# Patient Record
Sex: FEMALE | Race: WHITE | NOT HISPANIC OR LATINO | URBAN - METROPOLITAN AREA
[De-identification: names, ages, dates, MRNs, and addresses within clinical notes are randomized per-mention and may not be internally consistent; named-entity substitution may affect disease eponyms.]

---

## 2017-01-21 ENCOUNTER — EMERGENCY (EMERGENCY)
Facility: HOSPITAL | Age: 22
LOS: 0 days | Discharge: HOME | End: 2017-01-21
Admitting: INTERNAL MEDICINE

## 2017-01-21 DIAGNOSIS — R56.9 UNSPECIFIED CONVULSIONS: ICD-10-CM

## 2017-06-27 DIAGNOSIS — G40.909 EPILEPSY, UNSPECIFIED, NOT INTRACTABLE, WITHOUT STATUS EPILEPTICUS: ICD-10-CM

## 2017-06-27 DIAGNOSIS — R56.9 UNSPECIFIED CONVULSIONS: ICD-10-CM

## 2017-10-17 ENCOUNTER — EMERGENCY (EMERGENCY)
Facility: HOSPITAL | Age: 22
LOS: 0 days | Discharge: HOME | End: 2017-10-18
Admitting: INTERNAL MEDICINE
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
## 2017-10-17 DIAGNOSIS — S01.81XA LACERATION WITHOUT FOREIGN BODY OF OTHER PART OF HEAD, INITIAL ENCOUNTER: ICD-10-CM

## 2017-10-17 DIAGNOSIS — R56.9 UNSPECIFIED CONVULSIONS: ICD-10-CM

## 2017-10-17 DIAGNOSIS — Z79.899 OTHER LONG TERM (CURRENT) DRUG THERAPY: ICD-10-CM
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
## 2019-08-19 PROBLEM — Z00.00 ENCOUNTER FOR PREVENTIVE HEALTH EXAMINATION: Status: ACTIVE | Noted: 2019-08-19

## 2019-10-17 ENCOUNTER — APPOINTMENT (OUTPATIENT)
Dept: NEUROLOGY | Facility: CLINIC | Age: 24
End: 2019-10-17
Payer: COMMERCIAL

## 2019-10-17 VITALS
HEART RATE: 76 BPM | WEIGHT: 110 LBS | SYSTOLIC BLOOD PRESSURE: 105 MMHG | TEMPERATURE: 98.7 F | HEIGHT: 64 IN | BODY MASS INDEX: 18.78 KG/M2 | OXYGEN SATURATION: 99 % | DIASTOLIC BLOOD PRESSURE: 69 MMHG

## 2019-10-17 PROCEDURE — 95816 EEG AWAKE AND DROWSY: CPT

## 2019-10-17 PROCEDURE — 99214 OFFICE O/P EST MOD 30 MIN: CPT

## 2019-10-17 NOTE — HISTORY OF PRESENT ILLNESS
[FreeTextEntry1] : Misty is 23 years old right-handed who is being followed up by me for her primary generalized epilepsy syndrome.\par Her mom and her brother also has history of seizure and the mom the fact he has primary generalized epilepsy as well. Her brother has some autistic features.\par Misty is now working as a . She went to college could not continue having difficulty with subjects. She has a boyfriend she does not smoke or drink.\par At this point she is not driving.\par Unfortunately throughout the last 4 years that I have been taking care of her there were occasions that for different reasons she missed her doses and from there had seizures.\par Since her last visit in fact she did have 2 or 3 seizures one of them associated missing her doses. Now mom and dad are slightly more careful reminding her in different ways to take her medications.\par Her mood is described to be good she does have a slightly excessive sleepiness throughout the day or stays in bed longer than what should be expected for her age. On the other hand when she goes to work she is very active and not feeling tired.\par She did have a blood test done.. The Keppra and Lamictal levels aren't good both in mid tens.\par Her comprehensive metabolic panel is also normal.\par Today she did have one EEG done also that is normal however she did not have any drowsy or asleep recording

## 2019-10-17 NOTE — PHYSICAL EXAM
[FreeTextEntry1] : She appears to be tired. She shows some degree of slowness with processing. This is very much her baseline. She does have fixation on both sides and her fine motor movements are slightly clumsy.\par She shows good attention and concentration her vocabulary and language is good.\par Her motor exam is normal except to fixation and slight clumsiness. The deep tendon reflexes are present and symmetrical\par There is no tremor\par Her gait is stable Romberg is negative

## 2019-10-17 NOTE — ASSESSMENT
[FreeTextEntry1] : Misty is here for followup . She does carry the diagnosis of primary generalized epilepsy. She does have breakthrough seizures that the left than on associated not taking medication. This is quite unfortunate causes and her syndrome. The family at this point in came up with some plans that hopefully he is going to be helpful.\par We will continue her current dose of medications and I will see her in followup in 6 months with a blood level

## 2020-02-04 ENCOUNTER — APPOINTMENT (OUTPATIENT)
Dept: NEUROLOGY | Facility: CLINIC | Age: 25
End: 2020-02-04
Payer: COMMERCIAL

## 2020-02-04 VITALS
SYSTOLIC BLOOD PRESSURE: 109 MMHG | HEART RATE: 79 BPM | TEMPERATURE: 98.7 F | HEIGHT: 64 IN | WEIGHT: 110 LBS | OXYGEN SATURATION: 99 % | BODY MASS INDEX: 18.78 KG/M2 | DIASTOLIC BLOOD PRESSURE: 78 MMHG

## 2020-02-04 PROCEDURE — 99213 OFFICE O/P EST LOW 20 MIN: CPT

## 2020-02-04 NOTE — ASSESSMENT
[FreeTextEntry1] : Loretta is here for followup diagnosis of primary generalized epilepsy and had breakthrough seizures couple of weeks ago. At this point I would to a level and adjust medications based on the level I believe she needs a bigger cushion provided by higher level.\par Other speak with them on the phone having the result

## 2020-02-04 NOTE — PHYSICAL EXAM
[FreeTextEntry1] : She is awake alert oriented x3. She does have her baseline mild psychomotoric slowing. She follows 3-4 step commands her language is intact however she always answers with short sentences.\par Shows good attention. She is a slightly tired and sleepy.\par Cranial nerve exam is normal\par The motor exam shows clumsiness in both sides and fixation affecting the right side more than left.\par Her gait is stable Romberg is negative she has no tremor

## 2020-02-04 NOTE — HISTORY OF PRESENT ILLNESS
[FreeTextEntry1] : Misty is here for followup. Reason that she is having an earlier followup he is fact that she had one witnessed seizure almost 2 weeks ago. Mom reports they've and is seeing care having generalized tonic-clonic seizure because 8:30 in the morning and she had taken her medication almost 10 minutes prior to that.\par She denies having had any other issues she took her medication on that morning and the night before she slept reasonably well and she was not having any infections. She was 2 weeks after her menstruation. During that period she was a slightly on the stress by the fact that her mom had left knee injury and she was taking care of her as well.\par She denies having any warning about the event and she denies having had any sense of the spacing out or having myoclonus prior to the event.\par Otherwise she is at her baseline as she does have retained that her job requires her to do. Her mood is good

## 2020-04-07 ENCOUNTER — APPOINTMENT (OUTPATIENT)
Dept: NEUROLOGY | Facility: CLINIC | Age: 25
End: 2020-04-07

## 2020-04-21 ENCOUNTER — APPOINTMENT (OUTPATIENT)
Dept: NEUROLOGY | Facility: CLINIC | Age: 25
End: 2020-04-21

## 2020-06-30 ENCOUNTER — APPOINTMENT (OUTPATIENT)
Dept: NEUROLOGY | Facility: CLINIC | Age: 25
End: 2020-06-30
Payer: COMMERCIAL

## 2020-06-30 VITALS
TEMPERATURE: 98.5 F | HEART RATE: 72 BPM | SYSTOLIC BLOOD PRESSURE: 116 MMHG | HEIGHT: 64 IN | OXYGEN SATURATION: 100 % | DIASTOLIC BLOOD PRESSURE: 69 MMHG | BODY MASS INDEX: 18.61 KG/M2 | WEIGHT: 109 LBS

## 2020-06-30 PROCEDURE — 99214 OFFICE O/P EST MOD 30 MIN: CPT

## 2020-06-30 NOTE — HISTORY OF PRESENT ILLNESS
[FreeTextEntry1] : Misty is here for the F/U with Mom and her brother.\par She is staying at home and not working until they open the resturent.  She is sleeping better. she maintains a regular sleep schedule and is also not missing a dose.\par She did not have any seizures. Mom believes and I also agree that it is  to some extend related to her adding extra 250 mg of Keppra  during  he menstruation,\par Her menses are regular.\par she describes her mood as ok\par Did not see her PMD

## 2020-06-30 NOTE — ASSESSMENT
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

## 2020-06-30 NOTE — PHYSICAL EXAM
[FreeTextEntry1] : A/A/Ox3\par fllows 4 step commands\par slightly slow with the processing\par slightly concrete\par CN- ---intact\par Motor exam shows slight decrease of dexterity and also Fixation on the right side\par No dysmetria\par No drift\par mild low amplitude  positional tremor\par stable gait No dysmetria

## 2021-01-11 ENCOUNTER — APPOINTMENT (OUTPATIENT)
Dept: NEUROLOGY | Facility: CLINIC | Age: 26
End: 2021-01-11
Payer: COMMERCIAL

## 2021-01-11 VITALS
OXYGEN SATURATION: 97 % | WEIGHT: 110 LBS | DIASTOLIC BLOOD PRESSURE: 69 MMHG | HEART RATE: 79 BPM | HEIGHT: 64 IN | SYSTOLIC BLOOD PRESSURE: 110 MMHG | BODY MASS INDEX: 18.78 KG/M2 | TEMPERATURE: 97.2 F

## 2021-01-11 PROCEDURE — 99214 OFFICE O/P EST MOD 30 MIN: CPT

## 2021-01-11 PROCEDURE — 99072 ADDL SUPL MATRL&STAF TM PHE: CPT

## 2021-01-11 PROCEDURE — 95816 EEG AWAKE AND DROWSY: CPT

## 2021-01-11 NOTE — ASSESSMENT
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

## 2021-01-11 NOTE — PHYSICAL EXAM
[FreeTextEntry1] : A/A/Ox3\par follows 4 step commands\par slight psychomotor slowing\par CN-- normal\par ++ fixation both sides\par  No drift \par Mild tremor\par No dysmetria\par decreased  fine motor \par slight  increased tone , decreased swinging of hands when she walks\par \par

## 2021-01-11 NOTE — HISTORY OF PRESENT ILLNESS
[FreeTextEntry1] : Misty is here for the F/u with her parents\par Since last visit including the event few days ago she had two GTC.both happened in the morning and both on the day of her menstruation and perhaps to some extend also related to her sleep pattern.\par For the last episode reportedly she slept around 3 am . she woke up around  9 am to take her med. as usual went back to bed and an hour or so later Dad was called that she is having an episode. He found her having a GTC in the bed.\par She did not have a long post-ictal . It happened on the first day of her menstruation.\par She has been taking the extra dose of the keppra during her menstruation.\par No other events\par she usually goes back to bed and sleeps until 12 pm .also rarely doses off. is doing a lot around the house.\par Not gaining or losing weight\par Her mood is described as good\par \par

## 2021-03-14 ENCOUNTER — OUTPATIENT (OUTPATIENT)
Dept: OUTPATIENT SERVICES | Facility: HOSPITAL | Age: 26
LOS: 1 days | Discharge: HOME | End: 2021-03-14

## 2021-03-14 ENCOUNTER — LABORATORY RESULT (OUTPATIENT)
Age: 26
End: 2021-03-14

## 2021-03-14 DIAGNOSIS — Z11.59 ENCOUNTER FOR SCREENING FOR OTHER VIRAL DISEASES: ICD-10-CM

## 2021-03-15 LAB
25(OH)D3 SERPL-MCNC: 13 NG/ML
BASOPHILS # BLD AUTO: 0.05 K/UL
BASOPHILS NFR BLD AUTO: 0.8 %
EOSINOPHIL # BLD AUTO: 0.15 K/UL
EOSINOPHIL NFR BLD AUTO: 2.3 %
HCT VFR BLD CALC: 41.9 %
HGB BLD-MCNC: 14.3 G/DL
IMM GRANULOCYTES NFR BLD AUTO: 0.3 %
LYMPHOCYTES # BLD AUTO: 2.45 K/UL
LYMPHOCYTES NFR BLD AUTO: 38.3 %
MAGNESIUM SERPL-MCNC: 1.9 MG/DL
MAN DIFF?: NORMAL
MCHC RBC-ENTMCNC: 29.4 PG
MCHC RBC-ENTMCNC: 34.1 G/DL
MCV RBC AUTO: 86.2 FL
MONOCYTES # BLD AUTO: 0.43 K/UL
MONOCYTES NFR BLD AUTO: 6.7 %
NEUTROPHILS # BLD AUTO: 3.29 K/UL
NEUTROPHILS NFR BLD AUTO: 51.6 %
PLATELET # BLD AUTO: 158 K/UL
RBC # BLD: 4.86 M/UL
RBC # FLD: 13.4 %
WBC # FLD AUTO: 6.39 K/UL

## 2021-03-16 LAB — HCG UR QL: NEGATIVE

## 2021-03-17 ENCOUNTER — INPATIENT (INPATIENT)
Facility: HOSPITAL | Age: 26
LOS: 1 days | Discharge: HOME | End: 2021-03-19
Attending: INTERNAL MEDICINE | Admitting: INTERNAL MEDICINE
Payer: COMMERCIAL

## 2021-03-17 VITALS
SYSTOLIC BLOOD PRESSURE: 97 MMHG | HEIGHT: 65 IN | DIASTOLIC BLOOD PRESSURE: 52 MMHG | WEIGHT: 110.23 LBS | TEMPERATURE: 97 F | HEART RATE: 81 BPM | RESPIRATION RATE: 16 BRPM

## 2021-03-17 LAB
ALBUMIN SERPL ELPH-MCNC: 4 G/DL — SIGNIFICANT CHANGE UP (ref 3.5–5.2)
ALP SERPL-CCNC: 67 U/L — SIGNIFICANT CHANGE UP (ref 30–115)
ALT FLD-CCNC: 16 U/L — SIGNIFICANT CHANGE UP (ref 0–41)
ANION GAP SERPL CALC-SCNC: 8 MMOL/L — SIGNIFICANT CHANGE UP (ref 7–14)
AST SERPL-CCNC: 18 U/L — SIGNIFICANT CHANGE UP (ref 0–41)
BASOPHILS # BLD AUTO: 0.05 K/UL — SIGNIFICANT CHANGE UP (ref 0–0.2)
BASOPHILS NFR BLD AUTO: 0.8 % — SIGNIFICANT CHANGE UP (ref 0–1)
BILIRUB SERPL-MCNC: 0.4 MG/DL — SIGNIFICANT CHANGE UP (ref 0.2–1.2)
BUN SERPL-MCNC: 11 MG/DL — SIGNIFICANT CHANGE UP (ref 10–20)
CALCIUM SERPL-MCNC: 9.5 MG/DL — SIGNIFICANT CHANGE UP (ref 8.5–10.1)
CHLORIDE SERPL-SCNC: 102 MMOL/L — SIGNIFICANT CHANGE UP (ref 98–110)
CO2 SERPL-SCNC: 29 MMOL/L — SIGNIFICANT CHANGE UP (ref 17–32)
CREAT SERPL-MCNC: 0.7 MG/DL — SIGNIFICANT CHANGE UP (ref 0.7–1.5)
EOSINOPHIL # BLD AUTO: 0.09 K/UL — SIGNIFICANT CHANGE UP (ref 0–0.7)
EOSINOPHIL NFR BLD AUTO: 1.5 % — SIGNIFICANT CHANGE UP (ref 0–8)
GLUCOSE SERPL-MCNC: 83 MG/DL — SIGNIFICANT CHANGE UP (ref 70–99)
HCT VFR BLD CALC: 40.7 % — SIGNIFICANT CHANGE UP (ref 37–47)
HGB BLD-MCNC: 14.4 G/DL — SIGNIFICANT CHANGE UP (ref 12–16)
IMM GRANULOCYTES NFR BLD AUTO: 0.3 % — SIGNIFICANT CHANGE UP (ref 0.1–0.3)
LYMPHOCYTES # BLD AUTO: 2.12 K/UL — SIGNIFICANT CHANGE UP (ref 1.2–3.4)
LYMPHOCYTES # BLD AUTO: 35.1 % — SIGNIFICANT CHANGE UP (ref 20.5–51.1)
MAGNESIUM SERPL-MCNC: 1.9 MG/DL — SIGNIFICANT CHANGE UP (ref 1.8–2.4)
MCHC RBC-ENTMCNC: 29.9 PG — SIGNIFICANT CHANGE UP (ref 27–31)
MCHC RBC-ENTMCNC: 35.4 G/DL — SIGNIFICANT CHANGE UP (ref 32–37)
MCV RBC AUTO: 84.6 FL — SIGNIFICANT CHANGE UP (ref 81–99)
MONOCYTES # BLD AUTO: 0.54 K/UL — SIGNIFICANT CHANGE UP (ref 0.1–0.6)
MONOCYTES NFR BLD AUTO: 8.9 % — SIGNIFICANT CHANGE UP (ref 1.7–9.3)
NEUTROPHILS # BLD AUTO: 3.22 K/UL — SIGNIFICANT CHANGE UP (ref 1.4–6.5)
NEUTROPHILS NFR BLD AUTO: 53.4 % — SIGNIFICANT CHANGE UP (ref 42.2–75.2)
NRBC # BLD: 0 /100 WBCS — SIGNIFICANT CHANGE UP (ref 0–0)
PLATELET # BLD AUTO: 158 K/UL — SIGNIFICANT CHANGE UP (ref 130–400)
POTASSIUM SERPL-MCNC: 4.1 MMOL/L — SIGNIFICANT CHANGE UP (ref 3.5–5)
POTASSIUM SERPL-SCNC: 4.1 MMOL/L — SIGNIFICANT CHANGE UP (ref 3.5–5)
PROT SERPL-MCNC: 6.3 G/DL — SIGNIFICANT CHANGE UP (ref 6–8)
RBC # BLD: 4.81 M/UL — SIGNIFICANT CHANGE UP (ref 4.2–5.4)
RBC # FLD: 13 % — SIGNIFICANT CHANGE UP (ref 11.5–14.5)
SODIUM SERPL-SCNC: 139 MMOL/L — SIGNIFICANT CHANGE UP (ref 135–146)
WBC # BLD: 6.04 K/UL — SIGNIFICANT CHANGE UP (ref 4.8–10.8)
WBC # FLD AUTO: 6.04 K/UL — SIGNIFICANT CHANGE UP (ref 4.8–10.8)

## 2021-03-17 PROCEDURE — 99221 1ST HOSP IP/OBS SF/LOW 40: CPT

## 2021-03-17 PROCEDURE — 99222 1ST HOSP IP/OBS MODERATE 55: CPT

## 2021-03-17 RX ORDER — LAMOTRIGINE 25 MG/1
225 TABLET, ORALLY DISINTEGRATING ORAL EVERY 12 HOURS
Refills: 0 | Status: DISCONTINUED | OUTPATIENT
Start: 2021-03-17 | End: 2021-03-19

## 2021-03-17 RX ORDER — CHOLECALCIFEROL (VITAMIN D3) 125 MCG
1000 CAPSULE ORAL DAILY
Refills: 0 | Status: DISCONTINUED | OUTPATIENT
Start: 2021-03-17 | End: 2021-03-19

## 2021-03-17 RX ORDER — INFLUENZA VIRUS VACCINE 15; 15; 15; 15 UG/.5ML; UG/.5ML; UG/.5ML; UG/.5ML
0.5 SUSPENSION INTRAMUSCULAR ONCE
Refills: 0 | Status: DISCONTINUED | OUTPATIENT
Start: 2021-03-17 | End: 2021-03-19

## 2021-03-17 RX ORDER — ACETAMINOPHEN 500 MG
650 TABLET ORAL EVERY 4 HOURS
Refills: 0 | Status: DISCONTINUED | OUTPATIENT
Start: 2021-03-17 | End: 2021-03-19

## 2021-03-17 RX ORDER — LEVETIRACETAM 250 MG/1
750 TABLET, FILM COATED ORAL EVERY 12 HOURS
Refills: 0 | Status: DISCONTINUED | OUTPATIENT
Start: 2021-03-17 | End: 2021-03-19

## 2021-03-17 RX ADMIN — LEVETIRACETAM 750 MILLIGRAM(S): 250 TABLET, FILM COATED ORAL at 21:08

## 2021-03-17 RX ADMIN — LAMOTRIGINE 225 MILLIGRAM(S): 25 TABLET, ORALLY DISINTEGRATING ORAL at 21:08

## 2021-03-17 RX ADMIN — Medication 1000 UNIT(S): at 15:44

## 2021-03-17 NOTE — H&P ADULT - ASSESSMENT
Patient is a 25 year old female with hx of epilepsy (dx 2015) presenting after seizure last Thursday 3/11.    # seizure  - VEEG for characterization and treatment plan  - antiepileptics per epilepsy team  - seizure precaution  - regular diet  - ativan 2 mg IVP prn for status epilepticus > 2 min   Patient is a 25 year old female with hx of epilepsy (dx 2015) presenting after seizure last Thursday 3/11.    # seizure  - VEEG for characterization and treatment plan  - antiepileptics per epilepsy team  - seizure precaution  - regular diet  - ativan 2 mg IVP prn for status epilepticus > 2 min  - labs: CBC, CMP, Mg, Keppra level, lamotrigine level

## 2021-03-17 NOTE — CONSULT NOTE ADULT - ATTENDING COMMENTS
agree with the history and the plan  She does have HX of PGE with frequent breakthrough seizures.  She is here for further characterization and medication adjustments  will start the monitoring  No change in AED  seizure precaution

## 2021-03-17 NOTE — H&P ADULT - HISTORY OF PRESENT ILLNESS
Patient is a 25 year old female with hx of epilepsy (dx 2015) presenting after seizure last Thursday 3/11. Seizure was tonic clonic, witnessed by patient's parents, lasted 3-4 min, and was aborted with use of oxygen by mask (patient's father is a paramedic), + post ictal period. Patient was instructed by Dr. Acosta to take 1/2 extra dose of her antiepileptics after seizure. Last seizure prior to last week was January 2021. There have been no recent medication changes. Patient states she has seizures approximately 5 times per year. Compliant with medications    Denies fever, chills, sob, cp, cough, sore throat, abd pain, n/v/d, weakness, numbness, dizziness.

## 2021-03-17 NOTE — H&P ADULT - NSHPPHYSICALEXAM_GEN_ALL_CORE
Vital Signs (24 Hrs):  T(C): 35.9 (03-17-21 @ 11:30), Max: 35.9 (03-17-21 @ 11:30)  HR: 81 (03-17-21 @ 11:30) (81 - 81)  BP: 97/52 (03-17-21 @ 11:30) (97/52 - 97/52)  RR: 16 (03-17-21 @ 11:30) (16 - 16)  SpO2: --  Wt(kg): --  Daily Height in cm: 165.1 (17 Mar 2021 11:30)      PHYSICAL EXAM:  GENERAL: NAD, well-developed  SKIN: No rashes or lesions  HEAD:  Atraumatic, Normocephalic  EYES: EOMI, PERRLA, conjunctiva and sclera clear  NECK: Supple, No JVD  CHEST/LUNG: Clear to auscultation bilaterally; No wheeze  HEART: Regular rate and rhythm; No murmurs, rubs, or gallops. distal pulses 2+ and equal bilateral  ABDOMEN: Soft, Nontender, Nondistended; Bowel sounds present  EXTREMITIES:  No clubbing, cyanosis, or edema  CNS: AAOx3

## 2021-03-17 NOTE — CONSULT NOTE ADULT - SUBJECTIVE AND OBJECTIVE BOX
Neurology/Epilepsy Consult:    CAROL SHORE 25y Female  MRN-[id number]    Patient is a 25y old  Female who presents with a chief complaint of       HPI:        PAST MEDICAL & SURGICAL HISTORY:        FAMILY HISTORY:        Social History:          Risk factors:  Born full-term, , no complications  Normal growth and development  No febrile seizures/CNS infections  No head trauma with loss of consciousness      Allergy:  No Known Allergies        Home Medications:        MEDICATIONS  (STANDING):  influenza   Vaccine 0.5 milliLiter(s) IntraMuscular once  lamoTRIgine 225 milliGRAM(s) Oral every 12 hours  levETIRAcetam 750 milliGRAM(s) Oral every 12 hours    MEDICATIONS  (PRN):  LORazepam   Injectable 2 milliGRAM(s) IV Push two times a day PRN generalized tonic-clonic seizure lasting longer than 2 minutes        Review of systems:    Constitutional: No fever, weight loss or fatigue    Eyes: No eye pain or discharge  ENMT:  No difficulty hearing; No sinus or throat pain  Neck: No pain or stiffness  Respiratory: No cough, wheezing, chills or hemoptysis  Cardiovascular: No chest pain, palpitations, shortness of breath, dyspnea on exertion  Gastrointestinal: No abdominal pain, nausea, vomiting or hematemesis; No diarrhea or constipation.   Genitourinary: No dysuria, frequency, hematuria or incontinence  Neurological: As per HPI  Skin: No rashes or lesions   Endocrine: No heat or cold intolerance; No hair loss  Musculoskeletal: No joint pain or swelling  Psychiatric: No depression, anxiety, mood swings  Heme/Lymph: No easy bruising or bleeding gums        T(F): 96.7 (21 @ 11:30), Max: 96.7 (21 @ 11:30)  HR: 81 (21 @ 11:30) (81 - 81)  BP: 97/52 (21 @ 11:30) (97/52 - 97/52)  RR: 16 (21 @ 11:30) (16 - 16)  SpO2: --        Neurologic Examination:  General:  Appearance is consistent with chronologic age.  No abnormal facies.   General: The patient is oriented to person, place, time and date.  Recent and remote memory intact.  Follows 4-step directions. Fund of knowledge is intact and normal.  Language with normal repetition, comprehension and naming.  Nondysarthric.    Cranial nerves: EOMI w/o nystagmus, skew or reported double vision.  PERRL.  No ptosis/weakness of eyelid closure.  Facial sensation is normal with normal bite.  No facial asymmetry.  Hearing grossly intact b/l.  Palate elevates midline.  Tongue midline.  Motor examination:   Normal tone, bulk and range of motion.  No tenderness, twitching, tremors or involuntary movements.  Formal Muscle Strength Testin/5 UE; 5/5 LE.  No observable drift.  Reflexes:   2+ b/l pectoralis, biceps, triceps, brachioradialis, patella and Achilles.  Plantar response downgoing b/l.  Jaw jerk, Janeth, clonus absent.  Sensory examination:   Intact to light touch and pinprick, pain, temperature and proprioception and vibration in all extremities.  Cerebellum:   FTN/HKS intact with normal RODRIGO in all limbs.  No dysmetria or dysdiadokinesia.  Gait narrow based and normal.        Labs:   3/14/2021 COVID-19 PCR negative, urine pregnancy negative    3/15/2021 Vitamin D 13 ng/ml    2021 Keppra 13 mcg/ml, Lamictal 9.0 mcg/ml          Neuroimaging:  CT Head:   CT Angiography/Perfusion:   MRI Brain:   MRA Head/Neck:     Carotid US:       EEG:       Assessment:  This is a 25y Female with h/o       Discussed with Dr. Acosta      Plan:   - VEEG monitoring for better characterization and assessment  - Seizure precautions  - Ativan 2mg IV PRN for generalized tonic-clonic seizure lasting longer than 2 minutes, or two consecutive seizures without return to baseline in-between (ordered)  - CBC, CMP, Mg, CK, AED levels trough (ordered)  - Keep Mg above 2    Plan discussed with patient in details, all questions answered.    Discussed with hospitalist and PA. Neurology/Epilepsy Consult:    CAROL SHORE 25y Female  MRN-[id number]    Patient is a 25y old left-handed Female who presents for elective VEEG monitoring      HPI: History  obtained from patient and mother. EMR and outpatient records reviewed.        PAST MEDICAL & SURGICAL HISTORY:  Epilepsy      FAMILY HISTORY:        Social History:          Risk factors:  Born full-term, , no complications  Normal growth and development  No febrile seizures/CNS infections  No head trauma with loss of consciousness      Allergy:  No Known Allergies        Home Medications:        MEDICATIONS  (STANDING):  influenza   Vaccine 0.5 milliLiter(s) IntraMuscular once  lamoTRIgine 225 milliGRAM(s) Oral every 12 hours  levETIRAcetam 750 milliGRAM(s) Oral every 12 hours    MEDICATIONS  (PRN):  LORazepam   Injectable 2 milliGRAM(s) IV Push two times a day PRN generalized tonic-clonic seizure lasting longer than 2 minutes        Review of systems:    Constitutional: No fever, weight loss or fatigue    Eyes: No eye pain or discharge  ENMT:  No difficulty hearing; No sinus or throat pain  Neck: No pain or stiffness  Respiratory: No cough, wheezing, chills or hemoptysis  Cardiovascular: No chest pain, palpitations, shortness of breath, dyspnea on exertion  Gastrointestinal: No abdominal pain, nausea, vomiting or hematemesis; No diarrhea or constipation.   Genitourinary: No dysuria, frequency, hematuria or incontinence  Neurological: As per HPI  Skin: No rashes or lesions   Endocrine: No heat or cold intolerance; No hair loss  Musculoskeletal: No joint pain or swelling  Psychiatric: No depression, anxiety, mood swings  Heme/Lymph: No easy bruising or bleeding gums        T(F): 96.7 (21 @ 11:30), Max: 96.7 (21 @ 11:30)  HR: 81 (21 @ 11:30) (81 - 81)  BP: 97/52 (21 @ 11:30) (97/52 - 97/52)  RR: 16 (21 @ 11:30) (16 - 16)  SpO2: --        Neurologic Examination:  General:  Appearance is consistent with chronologic age.  No abnormal facies.   General: The patient is oriented to person, place, time and date.  Recent and remote memory intact.  Follows 4-step directions. Fund of knowledge is intact and normal.  Language with normal repetition, comprehension and naming.  Nondysarthric.    Cranial nerves: EOMI w/o nystagmus, skew or reported double vision.  PERRL.  No ptosis/weakness of eyelid closure.  Facial sensation is normal with normal bite.  No facial asymmetry.  Hearing grossly intact b/l.  Palate elevates midline.  Tongue midline.  Motor examination:   Normal tone, bulk and range of motion.  No tenderness, twitching, tremors or involuntary movements.  Formal Muscle Strength Testin/5 UE; 5/5 LE.  No observable drift.  Reflexes:   2+ b/l pectoralis, biceps, triceps, brachioradialis, patella and Achilles.  Plantar response downgoing b/l.  Jaw jerk, Janeth, clonus absent.  Sensory examination:   Intact to light touch and pinprick, pain, temperature and proprioception and vibration in all extremities.  Cerebellum:   FTN/HKS intact with normal RODRIGO in all limbs.  No dysmetria or dysdiadokinesia.  Gait narrow based and normal.        Labs:   3/14/2021 COVID-19 PCR negative, urine pregnancy negative    3/15/2021 Vitamin D 13 ng/ml    2021 Keppra 13 mcg/ml, Lamictal 9.0 mcg/ml          Neuroimaging:  CT Head:   CT Angiography/Perfusion:   MRI Brain:   MRA Head/Neck:     Carotid US:       EEG:       Assessment:  This is a 25y Female with h/o       Discussed with Dr. Acosta      Plan:   - VEEG monitoring for better characterization and assessment  - Seizure precautions  - Ativan 2mg IV PRN for generalized tonic-clonic seizure lasting longer than 2 minutes, or two consecutive seizures without return to baseline in-between (ordered)  - CBC, CMP, Mg, CK, AED levels trough (ordered)  - Keep Mg above 2    Plan discussed with patient in details, all questions answered.    Discussed with hospitalist and PA. Neurology/Epilepsy Consult:    CAROL SHORE 25y Female  MRN-[id number]    Patient is a 25y old left-handed Female who presents for elective VEEG monitoring      HPI: History  obtained from patient and mother. EMR and outpatient records reviewed.  Patient has h/o epilepsy diagnosed in 2015 when she had first witnessed GTC event preceded by myoclonic jerking of upper extremities. She was admitted to EMU for VEEG, started on AED. She is being followed as outpatient by Dr. Acosta.  Patient continued having occasional breakthrough seizures and her medication doses were adjusted over the years. Most recent seizure happened 6 days ago, witnessed by father. In the afternoon patient had GTC seizure lasting 3-4 minutes and followed by several minutes of lethargy/confusion.   Previously patient had GTC events in 2020 and 2021, and her seizures appeared to have catamenial pattern, therefore acetazolamide was added to her regimen. She is taking it during menses, as well as for 3 days before and after. Patient is compliant with medications, tolerating well.  She reports episodes of occasional dizziness upon awakening, and rare isolated myoclonic arms jerking. Patient never had episodes of staring/behavioral arrest.        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         Risk factors:  Born 37 wk gestation, , twin B, BW 5lb 7oz, no complications  Early developmental delays, PT/OT/ST  No febrile seizures/CNS infections  No head trauma with loss of consciousness      Allergy:  No Known Allergies        Home Medications:  Lamictal 225mg q12hrs  Keppra 750mg q12hrs  Acetazolamide 125mg q12hrs during menses        MEDICATIONS  (STANDING):  influenza   Vaccine 0.5 milliLiter(s) IntraMuscular once  lamoTRIgine 225 milliGRAM(s) Oral every 12 hours  levETIRAcetam 750 milliGRAM(s) Oral every 12 hours    MEDICATIONS  (PRN):  LORazepam   Injectable 2 milliGRAM(s) IV Push two times a day PRN generalized tonic-clonic seizure lasting longer than 2 minutes          T(F): 96.7 (21 @ 11:30), Max: 96.7 (21 @ 11:30)  HR: 81 (21 @ 11:30) (81 - 81)  BP: 97/52 (21 @ 11:30) (97/52 - 97/52)  RR: 16 (21 @ 11:30) (16 - 16)  SpO2: --        Neurologic Examination:  General:  Appearance is consistent with chronologic age.  No abnormal facies.   General: The patient is oriented to person, place, time and date.  Follows 4-step directions.  Language is slow with normal repetition, comprehension and naming.  Nondysarthric.    Cranial nerves: EOMI w/o nystagmus, skew or reported double vision.  PERRL.  No ptosis/weakness of eyelid closure.  Facial sensation is normal with normal bite.  No facial asymmetry.  Hearing grossly intact b/l.  Palate elevates midline.  Tongue midline.  Motor examination:   Normal tone, bulk and range of motion.  No tenderness, twitching, tremors or involuntary movements.  Formal Muscle Strength Testin/5 UE; 5/5 LE.  No observable drift.  Reflexes:   2+ b/l   Sensory examination:   Intact to light touch and pinprick, pain, temperature.  Cerebellum:   FTN/HKS intact with normal RODRIGO in all limbs.  No dysmetria or dysdiadokinesia.  Gait narrow based and normal.        Labs:   3/14/2021 COVID-19 PCR negative, urine pregnancy negative    3/15/2021 Vitamin D 13 ng/ml    2021 Keppra 13 mcg/ml, Lamictal 9.0 mcg/ml          REEG 2021 - normal    REEG 10/17/2019 - frequent generalized spike and wave complexes    VEEG 3/19 - 3/20/2015 - small number of 3 Hz generalized spikes and after going slow        Assessment:  This is a 25y Female with h/o primary generalized epilepsy s/p breakthrough seizure last . Patient's seizures appeared to have catamenial pattern, and acetazolamide was added to her regimen 2 months ago.      Discussed with Dr. Acosta      Plan:   - VEEG monitoring for better characterization and assessment  - Seizure precautions  - Continue home doses of AED for now (ordered)  - Start vitamin D supplementation (ordered)  - Ativan 2mg IV PRN for generalized tonic-clonic seizure lasting longer than 2 minutes, or two consecutive seizures without return to baseline in-between (ordered)  - CBC, CMP, Mg, AED levels trough (ordered)  - Keep Mg above 2  - Orthostatic vital signs (ordered)    Plan discussed with patient and mother in details, all questions answered.    Patient's mother was pre-tested for COVID and will remain with patient during entire hospitalization.    Discussed with medical and nursing team. Neurology/Epilepsy Consult:    CAROL SHORE 25y Female  MRN-[id number]    Patient is a 25y old left-handed Female who presents for elective VEEG monitoring      HPI: History  obtained from patient and mother. EMR and outpatient records reviewed.  Patient has h/o epilepsy diagnosed in 2015 when she had first witnessed GTC event preceded by myoclonic jerking of upper extremities. She was admitted to EMU for VEEG, started on AED. She is being followed as outpatient by Dr. Acosta.  Patient continued having occasional breakthrough seizures and her medication doses were adjusted over the years. Most recent seizure happened 6 days ago, witnessed by father. In the afternoon patient had GTC seizure lasting 3-4 minutes and followed by several minutes of lethargy/confusion.   Previously patient had GTC events in 2020 and 2021, and her seizures appeared to have catamenial pattern, therefore acetazolamide was added to her regimen. She is taking it during menses, as well as for 3 days before and after. LMP 3/1/2021, regular.  Patient is compliant with medications, tolerating well.  She reports episodes of occasional dizziness upon awakening, and rare isolated myoclonic arms jerking. Patient never had episodes of staring/behavioral arrest.        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         Risk factors:  Born 37 wk gestation, , twin B, BW 5lb 7oz, no complications  Early developmental delays, PT/OT/ST  No febrile seizures/CNS infections  No head trauma with loss of consciousness      Allergy:  No Known Allergies        Home Medications:  Lamictal 225mg q12hrs  Keppra 750mg q12hrs  Acetazolamide 125mg q12hrs during menses        MEDICATIONS  (STANDING):  influenza   Vaccine 0.5 milliLiter(s) IntraMuscular once  lamoTRIgine 225 milliGRAM(s) Oral every 12 hours  levETIRAcetam 750 milliGRAM(s) Oral every 12 hours    MEDICATIONS  (PRN):  LORazepam   Injectable 2 milliGRAM(s) IV Push two times a day PRN generalized tonic-clonic seizure lasting longer than 2 minutes          T(F): 96.7 (21 @ 11:30), Max: 96.7 (21 @ 11:30)  HR: 81 (21 @ 11:30) (81 - 81)  BP: 97/52 (21 @ 11:30) (97/52 - 97/52)  RR: 16 (21 @ 11:30) (16 - 16)  SpO2: --        Neurologic Examination:  General:  Appearance is consistent with chronologic age.  No abnormal facies.   General: The patient is oriented to person, place, time and date.  Follows 4-step directions.  Language is slow with normal repetition, comprehension and naming.  Nondysarthric.    Cranial nerves: EOMI w/o nystagmus, skew or reported double vision.  PERRL.  No ptosis/weakness of eyelid closure.  Facial sensation is normal with normal bite.  No facial asymmetry.  Hearing grossly intact b/l.  Palate elevates midline.  Tongue midline.  Motor examination:   Normal tone, bulk and range of motion.  No tenderness, twitching, tremors or involuntary movements.  Formal Muscle Strength Testin/5 UE; 5/5 LE.  No observable drift.  Reflexes:   2+ b/l   Sensory examination:   Intact to light touch and pinprick, pain, temperature.  Cerebellum:   FTN/HKS intact with normal RODRIGO in all limbs.  No dysmetria or dysdiadokinesia.  Gait narrow based and normal.        Labs:   3/14/2021 COVID-19 PCR negative, urine pregnancy negative    3/15/2021 Vitamin D 13 ng/ml    2021 Keppra 13 mcg/ml, Lamictal 9.0 mcg/ml          REEG 2021 - normal    REEG 10/17/2019 - frequent generalized spike and wave complexes    VEEG 3/19 - 3/20/2015 - small number of 3 Hz generalized spikes and after going slow        Assessment:  This is a 25y Female with h/o primary generalized epilepsy s/p breakthrough seizure last . Patient's seizures appeared to have catamenial pattern, and acetazolamide was added to her regimen 2 months ago.      Discussed with Dr. Acosta      Plan:   - VEEG monitoring for better characterization and assessment  - Seizure precautions  - Continue home doses of AED for now (ordered)  - Start vitamin D supplementation (ordered)  - Ativan 2mg IV PRN for generalized tonic-clonic seizure lasting longer than 2 minutes, or two consecutive seizures without return to baseline in-between (ordered)  - CBC, CMP, Mg, AED levels trough (ordered)  - Keep Mg above 2  - Orthostatic vital signs (ordered)    Plan discussed with patient and mother in details, all questions answered.    Patient's mother was pre-tested for COVID and will remain with patient during entire hospitalization.    Discussed with medical and nursing team. Neurology/Epilepsy Consult:    CAROL SHORE 25y Female  MRN-[id number]    Patient is a 25y old left-handed Female who presents for elective VEEG monitoring      HPI: History  obtained from patient and mother. EMR and outpatient records reviewed.  Patient has h/o epilepsy diagnosed in 2015 when she had first witnessed GTC event preceded by myoclonic jerking of upper extremities. She was admitted to EMU for VEEG, started on AED. She is being followed as outpatient by Dr. Acosta.  Prior to that in  she was evaluated in ED once for episode of possible seizure at school. Per EMR, patient appeared groggy in class an had episode of rapid blinking and eyes rolling. At that time it was attributed to sleep deprivation.  Sin  patient continued having occasional breakthrough GTC seizures and her medication doses were adjusted over the years. Most recent seizure happened 6 days ago, witnessed by father. In the afternoon patient had GTC seizure lasting 3-4 minutes and followed by several minutes of lethargy/confusion.   Previously patient had GTC events in 2020 and 2021, and her seizures appeared to have catamenial pattern, therefore acetazolamide was added to her regimen. She is taking it during menses, as well as for 3 days before and after. LMP 3/1/2021, regular.  Patient is compliant with medications, tolerating well.  She reports episodes of occasional dizziness upon awakening, and rare isolated myoclonic arms jerking. Her sleep pattern is not regular. Patient never had episodes of staring/behavioral arrest.        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         Risk factors:  Born 37 wk gestation, , twin B, BW 5lb 7oz, no complications  Early developmental delays, PT/OT/ST  No febrile seizures/CNS infections  No head trauma with loss of consciousness      Allergy:  No Known Allergies        Home Medications:  Lamictal 225mg q12hrs  Keppra 750mg q12hrs  Acetazolamide 125mg q12hrs during menses        MEDICATIONS  (STANDING):  influenza   Vaccine 0.5 milliLiter(s) IntraMuscular once  lamoTRIgine 225 milliGRAM(s) Oral every 12 hours  levETIRAcetam 750 milliGRAM(s) Oral every 12 hours    MEDICATIONS  (PRN):  LORazepam   Injectable 2 milliGRAM(s) IV Push two times a day PRN generalized tonic-clonic seizure lasting longer than 2 minutes          T(F): 96.7 (21 @ 11:30), Max: 96.7 (21 @ 11:30)  HR: 81 (21 @ 11:30) (81 - 81)  BP: 97/52 (21 @ 11:30) (97/52 - 97/52)  RR: 16 (21 @ 11:30) (16 - 16)  SpO2: --        Neurologic Examination:  General:  Appearance is consistent with chronologic age.  No abnormal facies.   General: The patient is oriented to person, place, time and date.  Follows 4-step directions.  Language is slow with normal repetition, comprehension and naming.  Nondysarthric.    Cranial nerves: EOMI w/o nystagmus, skew or reported double vision.  PERRL.  No ptosis/weakness of eyelid closure.  Facial sensation is normal with normal bite.  No facial asymmetry.  Hearing grossly intact b/l.  Palate elevates midline.  Tongue midline.  Motor examination:   Normal tone, bulk and range of motion.  No tenderness, twitching, tremors or involuntary movements.  Formal Muscle Strength Testin/5 UE; 5/5 LE.  No observable drift.  Reflexes:   2+ b/l   Sensory examination:   Intact to light touch and pinprick, pain, temperature.  Cerebellum:   FTN/HKS intact with normal RODRIGO in all limbs.  No dysmetria or dysdiadokinesia.  Gait narrow based and normal.        Labs:   3/14/2021 COVID-19 PCR negative, urine pregnancy negative    3/15/2021 Vitamin D 13 ng/ml    2021 Keppra 13 mcg/ml, Lamictal 9.0 mcg/ml      Neuroimaging:  MRI brain at [facility] 3/16/2021 - 5mm curvilinear T1 increased signal along posterior margin of the fornicesat the level of the foramen of bone marrow. 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 Colloid cyst is less likely but cannot be excluded.        REEG 2021 - normal    REEG 10/17/2019 - frequent generalized spike and wave complexes    VEEG 3/19 - 3/20/2015 - small number of 3 Hz generalized spikes and after going slow        Assessment:  This is a 25y Female with h/o primary generalized epilepsy s/p breakthrough seizure last weel. Patient's seizures appeared to have catamenial pattern, and acetazolamide was added to her regimen 2 months ago.      Discussed with Dr. Acosta      Plan:   - VEEG monitoring for better characterization and assessment  - Seizure precautions  - Continue home doses of AED for now (ordered)  - Start vitamin D supplementation (ordered)  - Ativan 2mg IV PRN for generalized tonic-clonic seizure lasting longer than 2 minutes, or two consecutive seizures without return to baseline in-between (ordered)  - CBC, CMP, Mg, AED levels trough (ordered)  - Keep Mg above 2  - Orthostatic vital signs (ordered)    Plan discussed with patient and mother in details, all questions answered.    Patient's mother was pre-tested for COVID and will remain with patient during entire hospitalization.    Discussed with medical and nursing team. Neurology/Epilepsy Consult:    CAROL SHORE 25y Female  MRN-[id number]    Patient is a 25y old left-handed Female who presents for elective VEEG monitoring      HPI: History  obtained from patient and mother. EMR and outpatient records reviewed.  Patient has h/o epilepsy diagnosed in 2015 when she had first witnessed GTC event preceded by myoclonic jerking of upper extremities. She was admitted to EMU for VEEG, started on AED. She is being followed as outpatient by Dr. Acotsa.  Prior to that in  she was evaluated in ED once for episode of possible seizure at school. Per EMR, patient appeared groggy in class an had episode of rapid blinking and eyes rolling. At that time it was attributed to sleep deprivation.  Since  patient continued having occasional breakthrough GTC seizures and her medication doses were adjusted over the years. Most recent event happened 6 days ago, witnessed by father. In the afternoon patient walked into the kitchen, and suddenly started falling. She appeared pale with lips cyanosis, was caught by father and lowered to the floor.  Patient remained stiff and unresponsive for 3-4 minutes, given O2 via face mask by father, then had several minutes of lethargy/confusion.   Previously patient had GTC events in 2020 and 2021, and her seizures appeared to have catamenial pattern, therefore acetazolamide was added to her regimen. She is taking it during menses, as well as for 3 days before and after. LMP 3/1/2021, regular.  Patient is compliant with medications, tolerating well.  She reports episodes of occasional dizziness upon awakening, and rare isolated myoclonic arms jerking. Her sleep pattern is not regular. Patient never had episodes of staring/behavioral arrest.        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         Risk factors:  Born 37 wk gestation, , twin B, BW 5lb 7oz, no complications  Early developmental delays, PT/OT/ST  No febrile seizures/CNS infections  No head trauma with loss of consciousness      Allergy:  No Known Allergies        Home Medications:  Lamictal 225mg q12hrs  Keppra 750mg q12hrs  Acetazolamide 125mg q12hrs during menses        MEDICATIONS  (STANDING):  influenza   Vaccine 0.5 milliLiter(s) IntraMuscular once  lamoTRIgine 225 milliGRAM(s) Oral every 12 hours  levETIRAcetam 750 milliGRAM(s) Oral every 12 hours    MEDICATIONS  (PRN):  LORazepam   Injectable 2 milliGRAM(s) IV Push two times a day PRN generalized tonic-clonic seizure lasting longer than 2 minutes          T(F): 96.7 (21 @ 11:30), Max: 96.7 (21 @ 11:30)  HR: 81 (21 @ 11:30) (81 - 81)  BP: 97/52 (21 @ 11:30) (97/52 - 97/52)  RR: 16 (21 @ 11:30) (16 - 16)  SpO2: --        Neurologic Examination:  General:  Appearance is consistent with chronologic age.  No abnormal facies.   General: The patient is oriented to person, place, time and date.  Follows 4-step directions.  Language is slow with normal repetition, comprehension and naming.  Nondysarthric.    Cranial nerves: EOMI w/o nystagmus, skew or reported double vision.  PERRL.  No ptosis/weakness of eyelid closure.  Facial sensation is normal with normal bite.  No facial asymmetry.  Hearing grossly intact b/l.  Palate elevates midline.  Tongue midline.  Motor examination:   Normal tone, bulk and range of motion.  No tenderness, twitching, tremors or involuntary movements.  Formal Muscle Strength Testin/5 UE; 5/5 LE.  No observable drift.  Reflexes:   2+ b/l   Sensory examination:   Intact to light touch and pinprick, pain, temperature.  Cerebellum:   FTN/HKS intact with normal RORDIGO in all limbs.  No dysmetria or dysdiadokinesia.  Gait narrow based and normal.        Labs:   3/14/2021 COVID-19 PCR negative, urine pregnancy negative    3/15/2021 Vitamin D 13 ng/ml    2021 Keppra 13 mcg/ml, Lamictal 9.0 mcg/ml      Neuroimaging:  MRI brain at [facility] 3/16/2021 - 5mm curvilinear T1 increased signal along posterior margin of the fornicesat the level of the foramen of bone marrow. 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 Colloid cyst is less likely but cannot be excluded.        REEG 2021 - normal    REEG 10/17/2019 - frequent generalized spike and wave complexes    VEEG 3/19 - 3/20/2015 - small number of 3 Hz generalized spikes and after going slow        Assessment:  This is a 25y Female with h/o primary generalized epilepsy s/p breakthrough seizure last weel. Patient's seizures appeared to have catamenial pattern, and acetazolamide was added to her regimen 2 months ago.      Discussed with Dr. Acosta      Plan:   - VEEG monitoring for better characterization and assessment  - Seizure precautions  - Continue home doses of AED for now (ordered)  - Start vitamin D supplementation (ordered)  - Ativan 2mg IV PRN for generalized tonic-clonic seizure lasting longer than 2 minutes, or two consecutive seizures without return to baseline in-between (ordered)  - CBC, CMP, Mg, AED levels trough (ordered)  - Keep Mg above 2  - Orthostatic vital signs (ordered)    Plan discussed with patient and mother in details, all questions answered.    Patient's mother was pre-tested for COVID and will remain with patient during entire hospitalization.    Discussed with medical and nursing team.

## 2021-03-17 NOTE — H&P ADULT - ATTENDING COMMENTS
Patient seen and examined at bedside independently of PA and agree with the H/P unless otherwise stated     1) Seizure disorder  -admit to VEEG Unit  -Seizure precautions   -Epilepsy consult   -AEDs as per Neurology team     2) Underweight   -BMI < 18  -weight loss and diet modification     dvt and gi ppx       # Progress Note Handoff  PENDING as follows  consults: Epilepsy   Test: VEEG   Family discussion: discussed with patient and mother at bedside and agreeable for inpatient VEEG   Disposition: Home once cleared by Neurology     Attending Physician Dr. Susannah Ojeda # [id number]

## 2021-03-18 LAB
COVID-19 SPIKE DOMAIN AB INTERP: NEGATIVE — SIGNIFICANT CHANGE UP
COVID-19 SPIKE DOMAIN ANTIBODY RESULT: 0.4 U/ML — SIGNIFICANT CHANGE UP
SARS-COV-2 IGG+IGM SERPL QL IA: 0.4 U/ML — SIGNIFICANT CHANGE UP
SARS-COV-2 IGG+IGM SERPL QL IA: NEGATIVE — SIGNIFICANT CHANGE UP

## 2021-03-18 PROCEDURE — 99232 SBSQ HOSP IP/OBS MODERATE 35: CPT

## 2021-03-18 PROCEDURE — 95720 EEG PHY/QHP EA INCR W/VEEG: CPT

## 2021-03-18 RX ADMIN — LAMOTRIGINE 225 MILLIGRAM(S): 25 TABLET, ORALLY DISINTEGRATING ORAL at 20:22

## 2021-03-18 RX ADMIN — LAMOTRIGINE 225 MILLIGRAM(S): 25 TABLET, ORALLY DISINTEGRATING ORAL at 08:21

## 2021-03-18 RX ADMIN — LEVETIRACETAM 750 MILLIGRAM(S): 250 TABLET, FILM COATED ORAL at 20:23

## 2021-03-18 RX ADMIN — LEVETIRACETAM 750 MILLIGRAM(S): 250 TABLET, FILM COATED ORAL at 08:21

## 2021-03-18 RX ADMIN — Medication 1000 UNIT(S): at 11:16

## 2021-03-19 ENCOUNTER — TRANSCRIPTION ENCOUNTER (OUTPATIENT)
Age: 26
End: 2021-03-19

## 2021-03-19 VITALS — WEIGHT: 110.23 LBS | HEIGHT: 65 IN

## 2021-03-19 PROBLEM — G40.909 EPILEPSY, UNSPECIFIED, NOT INTRACTABLE, WITHOUT STATUS EPILEPTICUS: Chronic | Status: ACTIVE | Noted: 2021-03-17

## 2021-03-19 LAB — LEVETIRACETAM SERPL-MCNC: 36.6 UG/ML

## 2021-03-19 PROCEDURE — 95720 EEG PHY/QHP EA INCR W/VEEG: CPT

## 2021-03-19 PROCEDURE — 99231 SBSQ HOSP IP/OBS SF/LOW 25: CPT

## 2021-03-19 RX ORDER — LEVETIRACETAM 250 MG/1
1 TABLET, FILM COATED ORAL
Qty: 0 | Refills: 0 | DISCHARGE

## 2021-03-19 RX ORDER — LAMOTRIGINE 25 MG/1
1 TABLET, ORALLY DISINTEGRATING ORAL
Qty: 0 | Refills: 0 | DISCHARGE

## 2021-03-19 RX ORDER — CHOLECALCIFEROL (VITAMIN D3) 125 MCG
1000 CAPSULE ORAL
Qty: 0 | Refills: 0 | DISCHARGE
Start: 2021-03-19

## 2021-03-19 RX ORDER — LAMOTRIGINE 25 MG/1
1 TABLET, ORALLY DISINTEGRATING ORAL
Qty: 360 | Refills: 3
Start: 2021-03-19 | End: 2022-03-13

## 2021-03-19 RX ORDER — LAMOTRIGINE 25 MG/1
1 TABLET, ORALLY DISINTEGRATING ORAL
Qty: 360 | Refills: 3
Start: 2021-03-19 | End: 2022-03-20

## 2021-03-19 RX ORDER — LEVETIRACETAM 250 MG/1
2 TABLET, FILM COATED ORAL
Qty: 270 | Refills: 3
Start: 2021-03-19 | End: 2022-03-13

## 2021-03-19 RX ORDER — LEVETIRACETAM 250 MG/1
2 TABLET, FILM COATED ORAL
Qty: 270 | Refills: 3
Start: 2021-03-19 | End: 2022-03-20

## 2021-03-19 RX ORDER — LAMOTRIGINE 25 MG/1
1 TABLET, ORALLY DISINTEGRATING ORAL
Qty: 90 | Refills: 3
Start: 2021-03-19 | End: 2022-03-13

## 2021-03-19 RX ORDER — LAMOTRIGINE 25 MG/1
1 TABLET, ORALLY DISINTEGRATING ORAL
Qty: 90 | Refills: 3
Start: 2021-03-19 | End: 2022-03-20

## 2021-03-19 RX ADMIN — LEVETIRACETAM 750 MILLIGRAM(S): 250 TABLET, FILM COATED ORAL at 09:12

## 2021-03-19 RX ADMIN — LAMOTRIGINE 225 MILLIGRAM(S): 25 TABLET, ORALLY DISINTEGRATING ORAL at 09:17

## 2021-03-19 NOTE — DISCHARGE NOTE PROVIDER - NSDCCPCAREPLAN_GEN_ALL_CORE_FT
PRINCIPAL DISCHARGE DIAGNOSIS  Diagnosis: Seizure  Assessment and Plan of Treatment: take all medications as recommended by neurology. seizure precautions

## 2021-03-19 NOTE — DIETITIAN INITIAL EVALUATION ADULT. - ORAL INTAKE PTA/DIET HISTORY
Pt eats well with a good appetite at baseline. No specific dietary restrictions noted. NKFA/intolerances. No food preferences r/t culture/Catholic. No vitamins/supplements. Nutrition edu not indicated. Pt does not have any medical conditions that warrant therapeutic diet + eats well with a good appetite.

## 2021-03-19 NOTE — DISCHARGE NOTE PROVIDER - NSFOLLOWUPCLINICS_GEN_ALL_ED_FT
A Family Medicine Doctor  Family Medicine  .  [place]   Phone:   Fax:   Follow Up Time:     A Neurologist  Neurology  .  [place]   Phone:   Fax:   Follow Up Time:

## 2021-03-19 NOTE — DISCHARGE NOTE PROVIDER - HOSPITAL COURSE
25y old female admitted for VEEG   VEEG monitoring completed, patient is cleared for discharge from neurology standpoint.  Follow up neurology appointment is scheduled with Dr. Acosta  for April 26, 2021 at 3:30 pm.    [address] [place] [address], [address]  [phone]    Rx for new formulation of AEDs sent to the pharmacy, insurance authorization process initiated:  Keppra XR 500mg in AM & 1000mg in HS  Lamictal XR 100mg in AM, 50mg at noon, 300mg at night    While pre-authorization is in progress, continue pre-admission doses of medications:  Keppra 750mg q12hrs  Lamictal 225mg q12hrs  Vitamin D 1000 IU daily    Patient was also given Rx for CBC, CMP, AED levels trough to be done prior to follow up.    Detailed written and verbal instructions regarding seizure precautions and follow up plan are given to the patient and mother, all questions answered. Patient and mother verbalized understanding.

## 2021-03-19 NOTE — DIETITIAN INITIAL EVALUATION ADULT. - FACTORS AFF FOOD INTAKE
Pt eating well throughout LOS. She does not love the hospital meals, however family has been bringing food. Appetite is at baseline. Po intake recorded as % per EMR. No nausea/abdominal pain with meals. No chewing/swallowing issues noted. No issues with diarrhea or constipation at this time./none

## 2021-03-19 NOTE — PROGRESS NOTE ADULT - SUBJECTIVE AND OBJECTIVE BOX
Epilepsy Attending Note:     CAROL SHORE    25y Female  MRN [id number]    Vital Signs Last 24 Hrs  T(C): 36.2 (19 Mar 2021 04:45), Max: 36.4 (18 Mar 2021 21:00)  T(F): 97.2 (19 Mar 2021 04:45), Max: 97.6 (18 Mar 2021 21:00)  HR: 59 (19 Mar 2021 04:45) (59 - 87)  BP: 91/52 (19 Mar 2021 04:45) (91/52 - 106/53)  BP(mean): --  RR: 16 (19 Mar 2021 04:45) (16 - 16)  SpO2: --                          14.4   6.04  )-----------( 158      ( 17 Mar 2021 18:55 )             40.7       03-17    139  |  102  |  11  ----------------------------<  83  4.1   |  29  |  0.7    Ca    9.5      17 Mar 2021 18:55  Mg     1.9     03-17    TPro  6.3  /  Alb  4.0  /  TBili  0.4  /  DBili  x   /  AST  18  /  ALT  16  /  AlkPhos  67  03-17      MEDICATIONS  (STANDING):  cholecalciferol 1000 Unit(s) Oral daily  influenza   Vaccine 0.5 milliLiter(s) IntraMuscular once  lamoTRIgine 225 milliGRAM(s) Oral every 12 hours  levETIRAcetam 750 milliGRAM(s) Oral every 12 hours    MEDICATIONS  (PRN):  acetaminophen   Tablet .. 650 milliGRAM(s) Oral every 4 hours PRN Mild Pain (1 - 3)  LORazepam   Injectable 2 milliGRAM(s) IV Push two times a day PRN generalized tonic-clonic seizure lasting longer than 2 minutes            VEEG in the last 24 hours:    Background---------------  9-10    Focal and generalized slowing----none    Interictal activity--------   large number of generalized spikes  and polys pikes and wave.This activity at times appears in rhythmic/regular 3 hz activity that could last for few seconds.                                      These runs were more frequently seen during the HV and PS and also early am and late PM    Events-------   as above    Seizures-------   brief electrographic events as above    Impression:  PGE     Plan -   Will discuss with the Mom and also with her              will consider modifying the type and the dosing of the AED ( please see the ACP discharge note)    
Epilepsy Attending Note:     ELMERCAROL FOSS    25y Female  MRN [id number]    Vital Signs Last 24 Hrs  T(C): 36.2 (18 Mar 2021 04:47), Max: 36.3 (17 Mar 2021 14:40)  T(F): 97.1 (18 Mar 2021 04:47), Max: 97.4 (17 Mar 2021 14:40)  HR: 73 (18 Mar 2021 04:47) (73 - 81)  BP: 107/54 (18 Mar 2021 04:47) (97/52 - 107/54)  BP(mean): --  RR: 16 (18 Mar 2021 04:47) (16 - 16)  SpO2: --                          14.4   6.04  )-----------( 158      ( 17 Mar 2021 18:55 )             40.7       03-17    139  |  102  |  11  ----------------------------<  83  4.1   |  29  |  0.7    Ca    9.5      17 Mar 2021 18:55  Mg     1.9     03-17    TPro  6.3  /  Alb  4.0  /  TBili  0.4  /  DBili  x   /  AST  18  /  ALT  16  /  AlkPhos  67  03-17      MEDICATIONS  (STANDING):  cholecalciferol 1000 Unit(s) Oral daily  influenza   Vaccine 0.5 milliLiter(s) IntraMuscular once  lamoTRIgine 225 milliGRAM(s) Oral every 12 hours  levETIRAcetam 750 milliGRAM(s) Oral every 12 hours    MEDICATIONS  (PRN):  acetaminophen   Tablet .. 650 milliGRAM(s) Oral every 4 hours PRN Mild Pain (1 - 3)  LORazepam   Injectable 2 milliGRAM(s) IV Push two times a day PRN generalized tonic-clonic seizure lasting longer than 2 minutes            VEEG in the last 24 hours:    Background-------------  9-10 hz    Focal and generalized slowing--------none    Interictal activity----------   frequent generalized spikes and spikes/polyspikes  and wave complexes at time in runs lasting up to 4-5 seconds    Events------------- none    Seizures--------  brief electroclinical events as above    Impression:  abnormal as above . C/W PGE    Plan -    discussed the findings in extend with the patient and the Mom             HV/PS             continue the monitoring and seizure precaution    
Epilepsy NP Note:    VEEG monitoring completed, patient is cleared for discharge from neurology standpoint.    Follow up neurology appointment is scheduled with Dr. Acosta  for April 26, 2021 at 3:30 pm.    [address] [place] [address], [address]  [phone]    Rx for new formulation of AEDs sent to the pharmacy, insurance authorization process initiated:  Keppra XR 500mg in AM & 1000mg in HS  Lamictal XR 100mg in AM, 50mg at noon, 300mg at night    While pre-authorization is in progress, continue pre-admission doses of medications:  Keppra 750mg q12hrs  Lamictal 225mg q12hrs  Vitamin D 1000 IU daily    Patient was also given Rx for CBC, CMP, AED levels trough to be done prior to follow up.    Detailed written and verbal instructions regarding seizure precautions and follow up plan are given to the patient and mother, all questions answered. Patient and mother verbalized understanding.    Discussed with PA.    
25 year old female with hx of epilepsy     interval on 24 EEG     PAST MEDICAL & SURGICAL HISTORY:  Epilepsy    MEDICATIONS  (STANDING):  cholecalciferol 1000 Unit(s) Oral daily  influenza   Vaccine 0.5 milliLiter(s) IntraMuscular once  lamoTRIgine 225 milliGRAM(s) Oral every 12 hours  levETIRAcetam 750 milliGRAM(s) Oral every 12 hours    MEDICATIONS  (PRN):  acetaminophen   Tablet .. 650 milliGRAM(s) Oral every 4 hours PRN Mild Pain (1 - 3)  LORazepam   Injectable 2 milliGRAM(s) IV Push two times a day PRN generalized tonic-clonic seizure lasting longer than 2 minutes    Vital Signs Last 24 Hrs  T(C): 36.2 (19 Mar 2021 04:45), Max: 36.4 (18 Mar 2021 21:00)  T(F): 97.2 (19 Mar 2021 04:45), Max: 97.6 (18 Mar 2021 21:00)  HR: 59 (19 Mar 2021 04:45) (59 - 87)  BP: 91/52 (19 Mar 2021 04:45) (91/52 - 106/53)  BP(mean): --  RR: 16 (19 Mar 2021 04:45) (16 - 16)  SpO2: --      PHYSICAL EXAM:  GENERAL: NAD, well-developed  SKIN: No rashes or lesions  HEAD:  Atraumatic, Normocephalic  EYES: EOMI, PERRLA, conjunctiva and sclera clear  NECK: Supple, No JVD  CHEST/LUNG: Clear to auscultation bilaterally; No wheeze  HEART: Regular rate and rhythm; No murmurs, rubs, or gallops. distal pulses 2+ and equal bilateral  ABDOMEN: Soft, Nontender, Nondistended; Bowel sounds present  EXTREMITIES:  No clubbing, cyanosis, or edema  CNS: AAOx 3                          14.4   6.04  )-----------( 158      ( 17 Mar 2021 18:55 )             40.7   03-17    139  |  102  |  11  ----------------------------<  83  4.1   |  29  |  0.7    Ca    9.5      17 Mar 2021 18:55  Mg     1.9     03-17    TPro  6.3  /  Alb  4.0  /  TBili  0.4  /  DBili  x   /  AST  18  /  ALT  16  /  AlkPhos  67  03-17    
25 year old female with hx of epilepsy (dx 2015) presenting after seizure last Thursday 3/11. Seizure was tonic clonic, witnessed by patient's parents, lasted 3-4 min, and was aborted with use of oxygen by mask (patient's father is a paramedic), + post ictal period. Patient was instructed by Dr. Acosta to take 1/2 extra dose of her antiepileptics after seizure. Last seizure prior to last week was January 2021. There have been no recent medication changes. Patient states she has seizures approximately 5 times per year. Compliant with medications    Denies fever, chills, sob, cp, cough, sore throat, abd pain, n/v/d, weakness, numbness, dizziness.    PAST MEDICAL & SURGICAL HISTORY:  Epilepsy    MEDICATIONS  (STANDING):  cholecalciferol 1000 Unit(s) Oral daily  influenza   Vaccine 0.5 milliLiter(s) IntraMuscular once  lamoTRIgine 225 milliGRAM(s) Oral every 12 hours  levETIRAcetam 750 milliGRAM(s) Oral every 12 hours    MEDICATIONS  (PRN):  acetaminophen   Tablet .. 650 milliGRAM(s) Oral every 4 hours PRN Mild Pain (1 - 3)  LORazepam   Injectable 2 milliGRAM(s) IV Push two times a day PRN generalized tonic-clonic seizure lasting longer than 2 minutes    Vital Signs Last 24 Hrs  T(C): 36 (18 Mar 2021 14:05), Max: 36.2 (18 Mar 2021 04:47)  T(F): 96.8 (18 Mar 2021 14:05), Max: 97.1 (18 Mar 2021 04:47)  HR: 87 (18 Mar 2021 14:05) (73 - 87)  BP: 91/52 (18 Mar 2021 14:05) (91/52 - 107/54)  BP(mean): --  RR: 16 (18 Mar 2021 14:05) (16 - 16)  SpO2: --    PHYSICAL EXAM:  GENERAL: NAD, well-developed  SKIN: No rashes or lesions  HEAD:  Atraumatic, Normocephalic  EYES: EOMI, PERRLA, conjunctiva and sclera clear  NECK: Supple, No JVD  CHEST/LUNG: Clear to auscultation bilaterally; No wheeze  HEART: Regular rate and rhythm; No murmurs, rubs, or gallops. distal pulses 2+ and equal bilateral  ABDOMEN: Soft, Nontender, Nondistended; Bowel sounds present  EXTREMITIES:  No clubbing, cyanosis, or edema  CNS: AAOx 3                          14.4   6.04  )-----------( 158      ( 17 Mar 2021 18:55 )             40.7   03-17    139  |  102  |  11  ----------------------------<  83  4.1   |  29  |  0.7    Ca    9.5      17 Mar 2021 18:55  Mg     1.9     03-17    TPro  6.3  /  Alb  4.0  /  TBili  0.4  /  DBili  x   /  AST  18  /  ALT  16  /  AlkPhos  67  03-17  
  CAROL SHORE  25y  Female      Patient is a 25y old  Female who presents with a chief complaint of seizure (18 Mar 2021 10:52)      INTERVAL HPI/OVERNIGHT EVENTS:    pt seen and examined at bedside this morning   -will continue with VEEG as per Epilepsy   -I was told that patient is seen by Dr. Mcdonough on the outside; hence will transfer service to Dr. Banuelos/Dr. Mcdonough (aware and will see the patient tomorro)    REVIEW OF SYSTEMS:  -denies any complaints     Vital Signs Last 24 Hrs  T(C): 36.2 (18 Mar 2021 04:47), Max: 36.3 (17 Mar 2021 14:40)  T(F): 97.1 (18 Mar 2021 04:47), Max: 97.4 (17 Mar 2021 14:40)  HR: 73 (18 Mar 2021 04:47) (73 - 80)  BP: 107/54 (18 Mar 2021 04:47) (97/53 - 107/54)  RR: 16 (18 Mar 2021 04:47) (16 - 16)    PHYSICAL EXAM:  GENERAL: NAD, well-groomed, well-developed  HEAD:  Atraumatic, Normocephalic; on VEEG   EYES: EOMI, PERRLA, conjunctiva and sclera clear  NERVOUS SYSTEM:  Alert & Oriented X 4, Good concentration; Motor Strength 5/5 B/L upper and lower extremities; DTRs 2+ intact and symmetric  CHEST/LUNG: Clear to percussion bilaterally; No rales, rhonchi, wheezing, or rubs  CV/HEART: Regular rate and rhythm; No murmurs, rubs, or gallops  GI/ABDOMEN: Soft, Nontender, Nondistended; Bowel sounds present  EXTREMITIES:  2+ Peripheral Pulses, No clubbing, cyanosis, or edema  SKIN: No rashes or lesions    LAB:                        14.4   6.04  )-----------( 158      ( 17 Mar 2021 18:55 )             40.7     03-17    139  |  102  |  11  ----------------------------<  83  4.1   |  29  |  0.7    Ca    9.5      17 Mar 2021 18:55  Mg     1.9     03-17    TPro  6.3  /  Alb  4.0  /  TBili  0.4  /  DBili  x   /  AST  18  /  ALT  16  /  AlkPhos  67  03-17    Daily     Daily   CAPILLARY BLOOD GLUCOSE      LIVER FUNCTIONS - ( 17 Mar 2021 18:55 )  Alb: 4.0 g/dL / Pro: 6.3 g/dL / ALK PHOS: 67 U/L / ALT: 16 U/L / AST: 18 U/L / GGT: x             RADIOLOGY:    Imaging Personally Reviewed:  [Y ] YES  [ ] NO    HEALTH ISSUES - PROBLEM Dx:    MEDS:  acetaminophen   Tablet .. 650 milliGRAM(s) Oral every 4 hours PRN  cholecalciferol 1000 Unit(s) Oral daily  influenza   Vaccine 0.5 milliLiter(s) IntraMuscular once  lamoTRIgine 225 milliGRAM(s) Oral every 12 hours  levETIRAcetam 750 milliGRAM(s) Oral every 12 hours  LORazepam   Injectable 2 milliGRAM(s) IV Push two times a day PRN

## 2021-03-19 NOTE — DISCHARGE NOTE NURSING/CASE MANAGEMENT/SOCIAL WORK - PATIENT PORTAL LINK FT
You can access the FollowMyHealth Patient Portal offered by [facility] by registering at the following website: [email or web address]. By joining [facility]’s FollowMyHealth portal, you will also be able to view your health information using other applications (apps) compatible with our system.

## 2021-03-19 NOTE — DISCHARGE NOTE PROVIDER - NSDCMRMEDTOKEN_GEN_ALL_CORE_FT
cholecalciferol oral tablet: 1000 unit(s) orally once a day  Keppra  mg oral tablet, extended release: take 1 tablet in the morning &amp; 2 tablets at night  LaMICtal  mg oral tablet, extended release: take 1 tablet in the morning &amp; 3 tablets at night  LaMICtal XR 50 mg oral tablet, extended release: 1 tablet orally once a day at 12 noon

## 2021-03-19 NOTE — DIETITIAN INITIAL EVALUATION ADULT. - OTHER INFO
Pt admitted sp seizure for VEEG monitoring for better characterization and assessment. Followed by neurology. Pt started on vitamin D supplement d/t deficiency.

## 2021-03-19 NOTE — PROGRESS NOTE ADULT - ASSESSMENT
24 y/o female admitted for VEEG         Generalized Epilepsy     Plan:   - VEEG monitoring for better characterization and assessment  - Seizure precautions  - Continue home doses of AED for now (ordered)  - Start vitamin D supplementation (ordered)  - Ativan 2mg IV PRN for generalized tonic-clonic seizure lasting longer than 2 minutes, or two consecutive seizures without return to baseline in-between (ordered)  - CBC, CMP, Mg, AED levels trough (ordered)  - Keep Mg above 2  - Orthostatic vital signs (ordered)  
26 y/o female admitted for VEEG         Generalized Epilepsy     Plan:   - VEEG monitoring for better characterization and assessment  - Seizure precautions  - Continue home doses of AED for now (ordered)  - Start vitamin D supplementation (ordered)  - Ativan 2mg IV PRN for generalized tonic-clonic seizure lasting longer than 2 minutes, or two consecutive seizures without return to baseline in-between (ordered)  - CBC, CMP, Mg, AED levels trough (ordered)  - Keep Mg above 2  - Orthostatic vital signs 
patient with seizures    1) Seizure disorder  -on VEEG   -seizure precautions   -Epilepsy following     2) vitamin D def  -continue with supplements     dvt and gi ppx       # Progress Note Handoff  PENDING as follows  consults: Epilepsy   Test: VEEG   Family discussion: [id number] with mother at bedside; agreeable for inpatient VEEG and also aware of the doctor change to Dr. Banuelos for tomorrow (agreeable; sees Dr. Mcdonough on the outside)  Disposition: home once cleared by Neuro    Attending Physician Dr. Susannah Ojeda # [id number]

## 2021-03-19 NOTE — DISCHARGE NOTE PROVIDER - CARE PROVIDER_API CALL
Lc Acosta)  Neurology  [address] [place] [address], [address]  [place] [address]  Phone: [phone]  Fax: [phone]  Follow Up Time:     Meagan Mcdonough  [id number]  [address] [place] [address] [address]  [place] [address]  Phone: [phone]  Fax: [phone]  Follow Up Time:

## 2021-03-19 NOTE — DIETITIAN INITIAL EVALUATION ADULT. - OTHER CALCULATIONS
wt used; dosing 50 kg; Kcal: 0619-7116 kcal/d (MSJ x 1.2-1.4 AF) underweight BMI considered; Protein: 60-70 g (1.2-1.4 g/kg) same as above; Fluid: 1 mL/kcal or per LIP

## 2021-03-19 NOTE — DIETITIAN INITIAL EVALUATION ADULT. - PHYSCIAL ASSESSMENT
BMI is borderline WNL. No reports of recent wt changes. Stable wt within the 110-115 lbs range. Consistent with dosing wt.    No edema noted; skin WDL (3/18). well nourished/other (specify)

## 2021-03-21 LAB — LAMOTRIGINE SERPL-MCNC: 15.2 UG/ML

## 2021-03-22 LAB — LEVETIRACETAM SERPL-MCNC: 23.3 UG/ML — SIGNIFICANT CHANGE UP (ref 10–40)

## 2021-03-24 LAB — LAMOTRIGINE SERPL-MCNC: 13.1 UG/ML — SIGNIFICANT CHANGE UP (ref 2–20)

## 2021-03-25 DIAGNOSIS — R63.6 UNDERWEIGHT: ICD-10-CM

## 2021-03-25 DIAGNOSIS — G40.409 OTHER GENERALIZED EPILEPSY AND EPILEPTIC SYNDROMES, NOT INTRACTABLE, WITHOUT STATUS EPILEPTICUS: ICD-10-CM

## 2021-03-25 DIAGNOSIS — G40.909 EPILEPSY, UNSPECIFIED, NOT INTRACTABLE, WITHOUT STATUS EPILEPTICUS: ICD-10-CM

## 2021-03-25 DIAGNOSIS — E55.9 VITAMIN D DEFICIENCY, UNSPECIFIED: ICD-10-CM

## 2021-03-26 RX ORDER — LAMOTRIGINE 25 MG/1
1 TABLET, ORALLY DISINTEGRATING ORAL
Qty: 90 | Refills: 3
Start: 2021-03-26 | End: 2022-03-20

## 2021-03-26 NOTE — CHART NOTE - NSCHARTNOTEFT_GEN_A_CORE
Epilepsy NP:    Patient's insurance denied brand name Lamictal XR and Keppra XR despite 2 appeals.  Discussed with patient's mother who agreed to try generic XR versions.  Rx sent to the pharmacy. Epilepsy NP:    Patient's insurance denied brand name Lamictal XR and Keppra XR despite 2 appeals.  Discussed with patient's mother who agreed to try generic XR versions.    Rx sent to the pharmacy:  Levetiracetam XR 500mg in AM & 1000mg in HS  Lamotrigine XR 100mg in AM, 50mg at noon, 300mg at night.    Medications covered by insurance, patient's mother notified.

## 2021-04-12 ENCOUNTER — RX RENEWAL (OUTPATIENT)
Age: 26
End: 2021-04-12

## 2021-04-26 ENCOUNTER — APPOINTMENT (OUTPATIENT)
Dept: NEUROLOGY | Facility: CLINIC | Age: 26
End: 2021-04-26
Payer: COMMERCIAL

## 2021-04-26 VITALS
SYSTOLIC BLOOD PRESSURE: 108 MMHG | HEIGHT: 64 IN | DIASTOLIC BLOOD PRESSURE: 70 MMHG | WEIGHT: 110 LBS | HEART RATE: 69 BPM | BODY MASS INDEX: 18.78 KG/M2 | TEMPERATURE: 97.9 F | OXYGEN SATURATION: 99 %

## 2021-04-26 PROCEDURE — 99214 OFFICE O/P EST MOD 30 MIN: CPT

## 2021-04-26 PROCEDURE — 99072 ADDL SUPL MATRL&STAF TM PHE: CPT

## 2021-04-26 RX ORDER — LEVETIRACETAM 500 MG/1
500 TABLET, FILM COATED ORAL TWICE DAILY
Qty: 90 | Refills: 2 | Status: DISCONTINUED | COMMUNITY
End: 2021-04-26

## 2021-04-26 RX ORDER — LAMOTRIGINE 200 MG/1
200 TABLET ORAL TWICE DAILY
Qty: 60 | Refills: 0 | Status: DISCONTINUED | COMMUNITY
Start: 2021-04-12 | End: 2021-04-26

## 2021-04-26 RX ORDER — LAMOTRIGINE 25 MG/1
25 TABLET ORAL TWICE DAILY
Qty: 60 | Refills: 6 | Status: DISCONTINUED | COMMUNITY
End: 2021-04-26

## 2021-04-27 NOTE — ASSESSMENT
[FreeTextEntry1] : PGE\par \par breakthrough GTC with some association with her menstruation\par \par plan\par will increase the aacetazolemide to 125 tid during and 3 days before and after menstruation\par discussed the sleep pattern\par F/U with levels

## 2021-04-27 NOTE — HISTORY OF PRESENT ILLNESS
[FreeTextEntry1] : Misty is here for the F/U with her Dad \par After discharge from  the hospital there were multiple back and forts in regard to the medications and insurance issues. eventually we settled down. However she did have another seizure during her menstruation. We had an MRI of the brain done . It showed   a possible lipoma or colloid cyst at the level of foramen of monro.\par THe levels that they did were also reviewed  It is from March 24th and it shows a LGN level of 13.1 and Keppra level of 23.3\par According bto Dad on the night prior to the seizure she perhaps slept  less than usual\par The usual pattern for her is going to sleep around 1.30 am and waking up around 9 and having the morning  meds with some orange juice and yogurt.then she goes back to sleep and gets up around 212 and occasionally takes a nap and goes to work at 5 and comes back around 11 pm\par Her menstruation is normal

## 2021-06-30 ENCOUNTER — APPOINTMENT (OUTPATIENT)
Dept: NEUROLOGY | Facility: CLINIC | Age: 26
End: 2021-06-30
Payer: COMMERCIAL

## 2021-06-30 VITALS
WEIGHT: 110 LBS | BODY MASS INDEX: 18.78 KG/M2 | HEART RATE: 69 BPM | HEIGHT: 64 IN | DIASTOLIC BLOOD PRESSURE: 70 MMHG | TEMPERATURE: 98.5 F | OXYGEN SATURATION: 99 % | SYSTOLIC BLOOD PRESSURE: 118 MMHG

## 2021-06-30 PROCEDURE — 99213 OFFICE O/P EST LOW 20 MIN: CPT

## 2021-07-02 NOTE — PHYSICAL EXAM
[FreeTextEntry1] : A/A/Ox3\par slght psychomor slowing\par + decreased dexterity and fixation right >left\par no drift\par no dysmetria\par stable gait

## 2021-07-02 NOTE — HISTORY OF PRESENT ILLNESS
[FreeTextEntry1] : derrell is here for the F/U\par \par She is doing well with the Seizures after the lat adjustments\par trying to take meds earlier and also eat and have energy bar in the morning\par regular menses\par back to work\par good appetite\par no behavioral issues\par

## 2021-12-01 ENCOUNTER — RX RENEWAL (OUTPATIENT)
Age: 26
End: 2021-12-01

## 2022-02-01 ENCOUNTER — APPOINTMENT (OUTPATIENT)
Dept: NEUROLOGY | Facility: CLINIC | Age: 27
End: 2022-02-01
Payer: COMMERCIAL

## 2022-02-01 VITALS
TEMPERATURE: 96.3 F | BODY MASS INDEX: 19.29 KG/M2 | DIASTOLIC BLOOD PRESSURE: 72 MMHG | SYSTOLIC BLOOD PRESSURE: 118 MMHG | HEIGHT: 64 IN | HEART RATE: 79 BPM | WEIGHT: 113 LBS | OXYGEN SATURATION: 99 %

## 2022-02-01 PROCEDURE — 99213 OFFICE O/P EST LOW 20 MIN: CPT

## 2022-02-01 NOTE — HISTORY OF PRESENT ILLNESS
[FreeTextEntry1] : Misty is here with the Mom and the brother. She is doing well. No seizures. No events. The improvement comes after she started to be more careful with her sleep. In fact the TV was moved out of her bedroom and she is also rarely using the phone in her bedroom\par She went back to the full time  Job at [employer].\par her shifts are 5-11 pm\par she eats better and sleeps better\par No change in her weight.\par Her mood is good\par No tremor\par regular menstruations\par No HA\par Did get two doses of vaccine\par did not see her PMD.\par Had her blood test yesterday

## 2022-02-01 NOTE — PHYSICAL EXAM
[FreeTextEntry1] : A/A/Ox3\par good attention\par follows 4 step commands\par slight psychomotor slowing\par CN- intact\par motor- no drift. No tremor\par no dysmetria\par stable gait

## 2022-03-30 ENCOUNTER — RX RENEWAL (OUTPATIENT)
Age: 27
End: 2022-03-30

## 2022-06-01 ENCOUNTER — RX RENEWAL (OUTPATIENT)
Age: 27
End: 2022-06-01

## 2022-07-13 ENCOUNTER — RX RENEWAL (OUTPATIENT)
Age: 27
End: 2022-07-13

## 2022-08-18 ENCOUNTER — RX RENEWAL (OUTPATIENT)
Age: 27
End: 2022-08-18

## 2022-08-18 ENCOUNTER — APPOINTMENT (OUTPATIENT)
Dept: NEUROLOGY | Facility: CLINIC | Age: 27
End: 2022-08-18

## 2022-08-18 VITALS
SYSTOLIC BLOOD PRESSURE: 110 MMHG | TEMPERATURE: 97.6 F | HEART RATE: 74 BPM | HEIGHT: 64 IN | OXYGEN SATURATION: 97 % | WEIGHT: 110 LBS | DIASTOLIC BLOOD PRESSURE: 66 MMHG | BODY MASS INDEX: 18.78 KG/M2

## 2022-08-18 PROCEDURE — 99213 OFFICE O/P EST LOW 20 MIN: CPT

## 2022-08-18 NOTE — HISTORY OF PRESENT ILLNESS
[FreeTextEntry1] : Antoinet is here for the F/U.\par She has been doing very well , however while in vacation she did have a seizure. It happened around 9 pm , right after taking the med.\par They believe while there her sleep pattern was also off .\par Otherwise she is doing well.\par she is happy, still works in the same place and is also having the same boyfriend. Her appetite is good\par weight is not changing\par no side effects\par

## 2022-08-18 NOTE — PHYSICAL EXAM
[FreeTextEntry1] : A/A/Ox3\par good mood good attention\par interacting well\par spontaneous\par CN- normal no drift\par ++ fixation both sides right> left\par stable gait\par negative Romberg

## 2023-02-10 ENCOUNTER — APPOINTMENT (OUTPATIENT)
Dept: NEUROLOGY | Facility: CLINIC | Age: 28
End: 2023-02-10
Payer: MEDICAID

## 2023-02-10 VITALS
OXYGEN SATURATION: 99 % | HEART RATE: 67 BPM | WEIGHT: 111.25 LBS | DIASTOLIC BLOOD PRESSURE: 66 MMHG | TEMPERATURE: 97.5 F | HEIGHT: 64 IN | BODY MASS INDEX: 18.99 KG/M2 | SYSTOLIC BLOOD PRESSURE: 115 MMHG

## 2023-02-10 PROCEDURE — 99213 OFFICE O/P EST LOW 20 MIN: CPT

## 2023-02-10 NOTE — PHYSICAL EXAM
[FreeTextEntry1] : A/A/Ox3 \par good mood good attention\par follows 4 step commands\par Normal CN\par No drift\par + decreased dexterity and fixation on the Rt.\par No dysmetria\par stable gait\par

## 2023-03-17 ENCOUNTER — RX CHANGE (OUTPATIENT)
Age: 28
End: 2023-03-17

## 2023-03-17 RX ORDER — LAMOTRIGINE 300 MG/1
300 TABLET, EXTENDED RELEASE ORAL
Qty: 90 | Refills: 1 | Status: DISCONTINUED | COMMUNITY
Start: 2021-10-01 | End: 2023-03-17

## 2023-08-08 ENCOUNTER — RX RENEWAL (OUTPATIENT)
Age: 28
End: 2023-08-08

## 2023-08-24 ENCOUNTER — APPOINTMENT (OUTPATIENT)
Dept: NEUROLOGY | Facility: CLINIC | Age: 28
End: 2023-08-24
Payer: MEDICAID

## 2023-08-24 VITALS
HEART RATE: 70 BPM | WEIGHT: 111 LBS | OXYGEN SATURATION: 100 % | SYSTOLIC BLOOD PRESSURE: 101 MMHG | DIASTOLIC BLOOD PRESSURE: 67 MMHG | BODY MASS INDEX: 18.95 KG/M2 | HEIGHT: 64 IN

## 2023-08-24 PROCEDURE — 99213 OFFICE O/P EST LOW 20 MIN: CPT

## 2023-08-27 NOTE — ASSESSMENT
[FreeTextEntry1] : PGE  plan check levels and perhaps add a small dose of regular lamictal to the am dose

## 2023-08-27 NOTE — PHYSICAL EXAM
[FreeTextEntry1] : A/A/Ox 3 good modd  good attention mild psychomotor slowing follows 4 step commands + fixation both sides let more than right slight decreased fine motor no drift no dysmetria stable gait

## 2023-08-27 NOTE — HISTORY OF PRESENT ILLNESS
[FreeTextEntry1] : Misty is here for the F/U accompanied by Mom and her brother She is very much at her baseline , however since last visit she did have a GTC . It happened 10 minutes after taking the am doseThey believe it happened because she worked late and coming home around 1 am. 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 Other than this she has been doing well Supposed to see the PMD next week Her menstruations are regular Denies joint and spinal pain She did do her blood test Results pending

## 2023-08-28 RX ORDER — LAMOTRIGINE 50 MG/1
50 TABLET, FILM COATED, EXTENDED RELEASE ORAL
Qty: 90 | Refills: 1 | Status: DISCONTINUED | COMMUNITY
Start: 2022-07-13 | End: 2023-08-28

## 2023-09-02 ENCOUNTER — APPOINTMENT (OUTPATIENT)
Dept: NEUROLOGY | Facility: CLINIC | Age: 28
End: 2023-09-02
Payer: MEDICAID

## 2023-09-02 PROCEDURE — 95816 EEG AWAKE AND DROWSY: CPT

## 2023-09-11 ENCOUNTER — INPATIENT (INPATIENT)
Facility: HOSPITAL | Age: 28
LOS: 1 days | Discharge: HOME | DRG: 53 | End: 2023-09-13
Attending: INTERNAL MEDICINE | Admitting: PSYCHIATRY & NEUROLOGY
Payer: MEDICAID

## 2023-09-11 VITALS
WEIGHT: 112.66 LBS | RESPIRATION RATE: 18 BRPM | OXYGEN SATURATION: 98 % | HEIGHT: 66 IN | HEART RATE: 76 BPM | TEMPERATURE: 96 F | DIASTOLIC BLOOD PRESSURE: 75 MMHG | SYSTOLIC BLOOD PRESSURE: 112 MMHG

## 2023-09-11 DIAGNOSIS — G40.909 EPILEPSY, UNSPECIFIED, NOT INTRACTABLE, WITHOUT STATUS EPILEPTICUS: ICD-10-CM

## 2023-09-11 LAB
ALBUMIN SERPL ELPH-MCNC: 4.3 G/DL — SIGNIFICANT CHANGE UP (ref 3.5–5.2)
ALP SERPL-CCNC: 66 U/L — SIGNIFICANT CHANGE UP (ref 30–115)
ALT FLD-CCNC: 19 U/L — SIGNIFICANT CHANGE UP (ref 0–41)
ANION GAP SERPL CALC-SCNC: 12 MMOL/L — SIGNIFICANT CHANGE UP (ref 7–14)
AST SERPL-CCNC: 21 U/L — SIGNIFICANT CHANGE UP (ref 0–41)
BASOPHILS # BLD AUTO: 0.05 K/UL — SIGNIFICANT CHANGE UP (ref 0–0.2)
BASOPHILS NFR BLD AUTO: 0.9 % — SIGNIFICANT CHANGE UP (ref 0–1)
BILIRUB SERPL-MCNC: 0.4 MG/DL — SIGNIFICANT CHANGE UP (ref 0.2–1.2)
BUN SERPL-MCNC: 11 MG/DL — SIGNIFICANT CHANGE UP (ref 10–20)
CALCIUM SERPL-MCNC: 9.1 MG/DL — SIGNIFICANT CHANGE UP (ref 8.4–10.5)
CHLORIDE SERPL-SCNC: 105 MMOL/L — SIGNIFICANT CHANGE UP (ref 98–110)
CO2 SERPL-SCNC: 25 MMOL/L — SIGNIFICANT CHANGE UP (ref 17–32)
CREAT SERPL-MCNC: 0.7 MG/DL — SIGNIFICANT CHANGE UP (ref 0.7–1.5)
EGFR: 122 ML/MIN/1.73M2 — SIGNIFICANT CHANGE UP
EOSINOPHIL # BLD AUTO: 0.15 K/UL — SIGNIFICANT CHANGE UP (ref 0–0.7)
EOSINOPHIL NFR BLD AUTO: 2.7 % — SIGNIFICANT CHANGE UP (ref 0–8)
GLUCOSE SERPL-MCNC: 129 MG/DL — HIGH (ref 70–99)
HCG UR QL: NEGATIVE — SIGNIFICANT CHANGE UP
HCT VFR BLD CALC: 42.1 % — SIGNIFICANT CHANGE UP (ref 37–47)
HGB BLD-MCNC: 14.3 G/DL — SIGNIFICANT CHANGE UP (ref 12–16)
IMM GRANULOCYTES NFR BLD AUTO: 0.4 % — HIGH (ref 0.1–0.3)
LYMPHOCYTES # BLD AUTO: 1.96 K/UL — SIGNIFICANT CHANGE UP (ref 1.2–3.4)
LYMPHOCYTES # BLD AUTO: 34.7 % — SIGNIFICANT CHANGE UP (ref 20.5–51.1)
MAGNESIUM SERPL-MCNC: 1.7 MG/DL — LOW (ref 1.8–2.4)
MCHC RBC-ENTMCNC: 31.1 PG — HIGH (ref 27–31)
MCHC RBC-ENTMCNC: 34 G/DL — SIGNIFICANT CHANGE UP (ref 32–37)
MCV RBC AUTO: 91.5 FL — SIGNIFICANT CHANGE UP (ref 81–99)
MONOCYTES # BLD AUTO: 0.33 K/UL — SIGNIFICANT CHANGE UP (ref 0.1–0.6)
MONOCYTES NFR BLD AUTO: 5.8 % — SIGNIFICANT CHANGE UP (ref 1.7–9.3)
NEUTROPHILS # BLD AUTO: 3.14 K/UL — SIGNIFICANT CHANGE UP (ref 1.4–6.5)
NEUTROPHILS NFR BLD AUTO: 55.5 % — SIGNIFICANT CHANGE UP (ref 42.2–75.2)
NRBC # BLD: 0 /100 WBCS — SIGNIFICANT CHANGE UP (ref 0–0)
PLATELET # BLD AUTO: 141 K/UL — SIGNIFICANT CHANGE UP (ref 130–400)
PMV BLD: 9.8 FL — SIGNIFICANT CHANGE UP (ref 7.4–10.4)
POTASSIUM SERPL-MCNC: 3.6 MMOL/L — SIGNIFICANT CHANGE UP (ref 3.5–5)
POTASSIUM SERPL-SCNC: 3.6 MMOL/L — SIGNIFICANT CHANGE UP (ref 3.5–5)
PROT SERPL-MCNC: 6.1 G/DL — SIGNIFICANT CHANGE UP (ref 6–8)
RBC # BLD: 4.6 M/UL — SIGNIFICANT CHANGE UP (ref 4.2–5.4)
RBC # FLD: 12.9 % — SIGNIFICANT CHANGE UP (ref 11.5–14.5)
SODIUM SERPL-SCNC: 142 MMOL/L — SIGNIFICANT CHANGE UP (ref 135–146)
WBC # BLD: 5.65 K/UL — SIGNIFICANT CHANGE UP (ref 4.8–10.8)
WBC # FLD AUTO: 5.65 K/UL — SIGNIFICANT CHANGE UP (ref 4.8–10.8)

## 2023-09-11 PROCEDURE — 83735 ASSAY OF MAGNESIUM: CPT

## 2023-09-11 PROCEDURE — 99221 1ST HOSP IP/OBS SF/LOW 40: CPT

## 2023-09-11 PROCEDURE — 80048 BASIC METABOLIC PNL TOTAL CA: CPT

## 2023-09-11 PROCEDURE — 85025 COMPLETE CBC W/AUTO DIFF WBC: CPT

## 2023-09-11 PROCEDURE — 80175 DRUG SCREEN QUAN LAMOTRIGINE: CPT

## 2023-09-11 PROCEDURE — 80177 DRUG SCRN QUAN LEVETIRACETAM: CPT

## 2023-09-11 PROCEDURE — 80053 COMPREHEN METABOLIC PANEL: CPT

## 2023-09-11 PROCEDURE — 36415 COLL VENOUS BLD VENIPUNCTURE: CPT

## 2023-09-11 PROCEDURE — 95716 VEEG EA 12-26HR CONT MNTR: CPT

## 2023-09-11 PROCEDURE — 81025 URINE PREGNANCY TEST: CPT

## 2023-09-11 PROCEDURE — 95700 EEG CONT REC W/VID EEG TECH: CPT

## 2023-09-11 RX ORDER — LAMOTRIGINE 25 MG/1
50 TABLET, ORALLY DISINTEGRATING ORAL
Refills: 0 | Status: DISCONTINUED | OUTPATIENT
Start: 2023-09-11 | End: 2023-09-11

## 2023-09-11 RX ORDER — ACETAMINOPHEN 500 MG
650 TABLET ORAL EVERY 6 HOURS
Refills: 0 | Status: DISCONTINUED | OUTPATIENT
Start: 2023-09-11 | End: 2023-09-13

## 2023-09-11 RX ORDER — LEVETIRACETAM 250 MG/1
1000 TABLET, FILM COATED ORAL
Refills: 0 | Status: DISCONTINUED | OUTPATIENT
Start: 2023-09-11 | End: 2023-09-13

## 2023-09-11 RX ORDER — CHOLECALCIFEROL (VITAMIN D3) 125 MCG
1000 CAPSULE ORAL EVERY 24 HOURS
Refills: 0 | Status: DISCONTINUED | OUTPATIENT
Start: 2023-09-12 | End: 2023-09-13

## 2023-09-11 RX ORDER — LEVETIRACETAM 250 MG/1
1000 TABLET, FILM COATED ORAL DAILY
Refills: 0 | Status: DISCONTINUED | OUTPATIENT
Start: 2023-09-11 | End: 2023-09-11

## 2023-09-11 RX ORDER — INFLUENZA VIRUS VACCINE 15; 15; 15; 15 UG/.5ML; UG/.5ML; UG/.5ML; UG/.5ML
0.5 SUSPENSION INTRAMUSCULAR ONCE
Refills: 0 | Status: DISCONTINUED | OUTPATIENT
Start: 2023-09-11 | End: 2023-09-13

## 2023-09-11 RX ORDER — LANOLIN ALCOHOL/MO/W.PET/CERES
3 CREAM (GRAM) TOPICAL AT BEDTIME
Refills: 0 | Status: DISCONTINUED | OUTPATIENT
Start: 2023-09-11 | End: 2023-09-11

## 2023-09-11 RX ORDER — CHLORHEXIDINE GLUCONATE 213 G/1000ML
1 SOLUTION TOPICAL
Refills: 0 | Status: DISCONTINUED | OUTPATIENT
Start: 2023-09-11 | End: 2023-09-13

## 2023-09-11 RX ORDER — ONDANSETRON 8 MG/1
4 TABLET, FILM COATED ORAL EVERY 8 HOURS
Refills: 0 | Status: DISCONTINUED | OUTPATIENT
Start: 2023-09-11 | End: 2023-09-13

## 2023-09-11 RX ORDER — LAMOTRIGINE 25 MG/1
100 TABLET, ORALLY DISINTEGRATING ORAL DAILY
Refills: 0 | Status: DISCONTINUED | OUTPATIENT
Start: 2023-09-11 | End: 2023-09-11

## 2023-09-11 RX ORDER — FOLIC ACID 0.8 MG
1 TABLET ORAL EVERY 24 HOURS
Refills: 0 | Status: DISCONTINUED | OUTPATIENT
Start: 2023-09-12 | End: 2023-09-13

## 2023-09-11 RX ORDER — LAMOTRIGINE 25 MG/1
50 TABLET, ORALLY DISINTEGRATING ORAL ONCE
Refills: 0 | Status: COMPLETED | OUTPATIENT
Start: 2023-09-11 | End: 2023-09-11

## 2023-09-11 RX ORDER — LEVETIRACETAM 250 MG/1
500 TABLET, FILM COATED ORAL
Refills: 0 | Status: DISCONTINUED | OUTPATIENT
Start: 2023-09-11 | End: 2023-09-13

## 2023-09-11 RX ORDER — LEVETIRACETAM 250 MG/1
500 TABLET, FILM COATED ORAL DAILY
Refills: 0 | Status: DISCONTINUED | OUTPATIENT
Start: 2023-09-11 | End: 2023-09-11

## 2023-09-11 RX ORDER — LAMOTRIGINE 25 MG/1
100 TABLET, ORALLY DISINTEGRATING ORAL
Refills: 0 | Status: DISCONTINUED | OUTPATIENT
Start: 2023-09-11 | End: 2023-09-13

## 2023-09-11 RX ORDER — LAMOTRIGINE 25 MG/1
50 TABLET, ORALLY DISINTEGRATING ORAL
Refills: 0 | Status: DISCONTINUED | OUTPATIENT
Start: 2023-09-11 | End: 2023-09-13

## 2023-09-11 RX ADMIN — LEVETIRACETAM 1000 MILLIGRAM(S): 250 TABLET, FILM COATED ORAL at 21:39

## 2023-09-11 RX ADMIN — LAMOTRIGINE 50 MILLIGRAM(S): 25 TABLET, ORALLY DISINTEGRATING ORAL at 21:38

## 2023-09-11 RX ADMIN — LAMOTRIGINE 50 MILLIGRAM(S): 25 TABLET, ORALLY DISINTEGRATING ORAL at 13:12

## 2023-09-11 NOTE — PATIENT PROFILE ADULT - FALL HARM RISK - FACTORS
Start Eliquis 5 mg twice a day.  Start Metoprolol 25 mg twice a day.   IV and/or equipment tethered to patient/Other

## 2023-09-11 NOTE — H&P ADULT - HISTORY OF PRESENT ILLNESS
27F w/PMHx of generalized tonic/clonic seizures presents with mother for V-EEG.  Patient reports Hx of epilepsy, last seizure approx 3 weeks ago but usually has approx 2-3 per year.  Last prior was 11/22.  Reports usually has yearly V-eeg monitoring but since COVID hasn't had one in several years so sent in for eval.  Patient denies any acute complaints, no CP/SOB.  No abdominal or urinary complaints.  Medications reviewed with patient and mother.

## 2023-09-11 NOTE — CONSULT NOTE ADULT - SUBJECTIVE AND OBJECTIVE BOX
Neurology/Epilepsy Consult:    CAROL SHORE 27y Female  MRN-[id number]    Patient is a 27y old  Female who presents with a chief complaint of V-EEG (11 Sep 2023 12:53)        HPI:  27F w/PMHx of generalized tonic/clonic seizures presents with mother for V-EEG.  Patient reports Hx of epilepsy, last seizure approx 3 weeks ago but usually has approx 2-3 per year.  Last prior was .  Reports usually has yearly V-eeg monitoring but since COVID hasn't had one in several years so sent in for eval.  Patient denies any acute complaints, no CP/SOB.  No abdominal or urinary complaints.  Medications reviewed with patient and mother.   (11 Sep 2023 12:53)        PAST MEDICAL & SURGICAL HISTORY:  Epilepsy      No significant past surgical history            FAMILY HISTORY:        Social History:          Risk factors:  Born full-term, , no complications  Normal growth and development  No febrile seizures/CNS infections  No head trauma with loss of consciousness      Allergy:  No Known Allergies        Home Medications:  cholecalciferol oral tablet: 1000 unit(s) orally once a day (19 Mar 2021 12:29)        MEDICATIONS  (STANDING):  lamoTRIgine ER 50 milliGRAM(s) Oral <User Schedule>    MEDICATIONS  (PRN):  acetaminophen     Tablet .. 650 milliGRAM(s) Oral every 6 hours PRN Temp greater or equal to 38C (100.4F), Mild Pain (1 - 3)  aluminum hydroxide/magnesium hydroxide/simethicone Suspension 30 milliLiter(s) Oral every 4 hours PRN Dyspepsia  LORazepam   Injectable 2 milliGRAM(s) IV Push three times a day PRN generalized tonic-clonic seizure lasting longer than 2 minutes, or two consecutive seizures without return to baseline in-between  ondansetron Injectable 4 milliGRAM(s) IV Push every 8 hours PRN Nausea and/or Vomiting            T(F): 96 (23 @ 10:23), Max: 96 (23 @ 10:23)  HR: 76 (23 @ 10:23) (76 - 76)  BP: 112/75 (23 @ 10:23) (112/75 - 112/75)  RR: 18 (23 @ 10:23) (18 - 18)  SpO2: 98% (23 @ 10:23) (98% - 98%)        Neurologic Examination:  General:  Appearance is consistent with chronologic age.  No abnormal facies.   General: The patient is oriented to person, place, time and date.  Recent and remote memory intact.  Follows 4-step directions. Fund of knowledge is intact and normal.  Language with normal repetition, comprehension and naming.  Nondysarthric.    Cranial nerves: EOMI w/o nystagmus, skew or reported double vision.  PERRL.  No ptosis/weakness of eyelid closure.  Facial sensation is normal with normal bite.  No facial asymmetry.  Hearing grossly intact b/l.  Palate elevates midline.  Tongue midline.  Motor examination:   Normal tone, bulk and range of motion.  No tenderness, twitching, tremors or involuntary movements.  Formal Muscle Strength Testin/5 UE; 5/5 LE.  No observable drift.  Reflexes:   2+ b/l pectoralis, biceps, triceps, brachioradialis, patella and Achilles.  Plantar response downgoing b/l.  Jaw jerk, Janeth, clonus absent.  Sensory examination:   Intact to light touch and pinprick, pain, temperature and proprioception and vibration in all extremities.  Cerebellum:   FTN/HKS intact with normal RODRIGO in all limbs.  No dysmetria or dysdiadokinesia.  Gait narrow based and normal.        Labs:                     Pregnancy Profile, Urine: Negative (23 @ 10:10)              Neuroimaging:  CT Head:   CT Angiography/Perfusion:   MRI Brain:   MRA Head/Neck:     Carotid US:         EEG:       Assessment:  This is a 27y Female with h/o         Discussed with Dr. Acosta        Plan:   - VEEG monitoring for better characterization and assessment  - Seizure precautions  - Ativan 2mg IV PRN for generalized tonic-clonic seizure lasting longer than 2 minutes, or two consecutive seizures without return to baseline in-between (ordered)  - CBC, CMP, Mg, CK, AED levels trough (ordered)  - Keep Mg above 2    Plan discussed with patient in details, all questions answered.    Discussed with nursing team, hospitalist, and PA. Neurology/Epilepsy Consult:    CRAOL SHORE 27y Female  MRN-[id number]    Patient is a 27y old left-handed Female who presents for elective VEEG        HPI: HIstory obtained from patient and mother. EMR and outpatient records reviewed.          PAST MEDICAL & SURGICAL HISTORY:  Epilepsy      No significant past surgical history            FAMILY HISTORY:        Social History:          Risk factors:  Born full-term, , no complications  Normal growth and development  No febrile seizures/CNS infections  No head trauma with loss of consciousness      Allergy:  No Known Allergies        Home Medications:  cholecalciferol oral tablet: 1000 unit(s) orally once a day (19 Mar 2021 12:29)        MEDICATIONS  (STANDING):  lamoTRIgine ER 50 milliGRAM(s) Oral <User Schedule>    MEDICATIONS  (PRN):  acetaminophen     Tablet .. 650 milliGRAM(s) Oral every 6 hours PRN Temp greater or equal to 38C (100.4F), Mild Pain (1 - 3)  aluminum hydroxide/magnesium hydroxide/simethicone Suspension 30 milliLiter(s) Oral every 4 hours PRN Dyspepsia  LORazepam   Injectable 2 milliGRAM(s) IV Push three times a day PRN generalized tonic-clonic seizure lasting longer than 2 minutes, or two consecutive seizures without return to baseline in-between  ondansetron Injectable 4 milliGRAM(s) IV Push every 8 hours PRN Nausea and/or Vomiting            T(F): 96 (23 @ 10:23), Max: 96 (23 @ 10:23)  HR: 76 (23 @ 10:23) (76 - 76)  BP: 112/75 (23 @ 10:23) (112/75 - 112/75)  RR: 18 (23 @ 10:23) (18 - 18)  SpO2: 98% (23 @ 10:23) (98% - 98%)        Neurologic Examination:  General:  Appearance is consistent with chronologic age.  No abnormal facies.   General: The patient is oriented to person, place, time and date.  Recent and remote memory intact.  Follows 4-step directions. Fund of knowledge is intact and normal.  Language with normal repetition, comprehension and naming.  Nondysarthric.    Cranial nerves: EOMI w/o nystagmus, skew or reported double vision.  PERRL.  No ptosis/weakness of eyelid closure.  Facial sensation is normal with normal bite.  No facial asymmetry.  Hearing grossly intact b/l.  Palate elevates midline.  Tongue midline.  Motor examination:   Normal tone, bulk and range of motion.  No tenderness, twitching, tremors or involuntary movements.  Formal Muscle Strength Testin/5 UE; 5/5 LE.  No observable drift.  Reflexes:   2+ b/l pectoralis, biceps, triceps, brachioradialis, patella and Achilles.  Plantar response downgoing b/l.  Jaw jerk, Janeth, clonus absent.  Sensory examination:   Intact to light touch and pinprick, pain, temperature and proprioception and vibration in all extremities.  Cerebellum:   FTN/HKS intact with normal RODRIGO in all limbs.  No dysmetria or dysdiadokinesia.  Gait narrow based and normal.        Labs:                     Pregnancy Profile, Urine: Negative (23 @ 10:10)              Neuroimaging:  CT Head:   CT Angiography/Perfusion:   MRI Brain:   MRA Head/Neck:     Carotid US:         EEG:       Assessment:  This is a 27y Female with h/o         Discussed with Dr. Acosta        Plan:   - VEEG monitoring for better characterization and assessment  - Seizure precautions  - Ativan 2mg IV PRN for generalized tonic-clonic seizure lasting longer than 2 minutes, or two consecutive seizures without return to baseline in-between (ordered)  - CBC, CMP, Mg, CK, AED levels trough (ordered)  - Keep Mg above 2    Plan discussed with patient in details, all questions answered.    Discussed with nursing team, hospitalist, and PA. Neurology/Epilepsy Consult:    CAROL SHORE 27y Female  MRN-[id number]    Patient is a 27y old left-handed Female who presents for elective VEEG        HPI: History obtained from patient and mother. EMR and outpatient records reviewed.  Patient has h/o epilepsy diagnosed in 2015 when she had first witnessed GTC event preceded by myoclonic jerking of upper extremities. She was admitted to EMU for VEEG, started on seizure medicaiton. She is being followed as outpatient by Dr. Acosta.  Since then patient continued having occasional breakthrough GTC seizures and her medication doses were adjusted over the years. Most recent event happened about 2 weeks ago at home, witnessed by parents. In the morning patient woke up, took her medications as always, and walked her twin brother to the door. She returned to her room and wanted to isabela back to sleep since went to bed late the night prior, but does not recall what happened next. Per parents, they saw on home camera that she had GTC seizure lasting about 1 minute followed by lethargy.   Previously patient had GTC events in 2020, 2021, 2023 and her seizures appeared to have catamenial pattern, therefore acetazolamide was added to her regimen. She is taking it during menses, as well as for 3 days before and after. LMP 2023.  She reports rare isolated myoclonic arms jerking. Her sleep pattern is not regular. Patient never had episodes of staring/behavioral arrest.  Patient is compliant with medications, tolerating well, no missed doses reported. Her Lamictal dose was increased by 50mg after most recent seizure.          PAST MEDICAL & SURGICAL HISTORY:  Epilepsy          FAMILY HISTORY:  Mother - epilepsy  Brother - autism, seizure x 1  Aunt - CP        Social History:  Lives with parents  Attends college, takes classes remotely        Risk factors:  Born 37 wk gestation, , twin B, BW 5lb 7oz, no complications  Early developmental delays, PT/OT/ST  No febrile seizures/CNS infections  No head trauma with loss of consciousness        Allergy:  No Known Allergies        Home Medications:  Lamictal 225mg q12hrs  Keppra 750mg q12hrs  Acetazolamide 125mg q12hrs during menses  cholecalciferol oral tablet: 1000 unit(s) orally once a day  Folic acid  Iron        MEDICATIONS  (STANDING):  lamoTRIgine ER 50 milliGRAM(s) Oral <User Schedule>    MEDICATIONS  (PRN):  acetaminophen     Tablet .. 650 milliGRAM(s) Oral every 6 hours PRN Temp greater or equal to 38C (100.4F), Mild Pain (1 - 3)  aluminum hydroxide/magnesium hydroxide/simethicone Suspension 30 milliLiter(s) Oral every 4 hours PRN Dyspepsia  LORazepam   Injectable 2 milliGRAM(s) IV Push three times a day PRN generalized tonic-clonic seizure lasting longer than 2 minutes, or two consecutive seizures without return to baseline in-between  ondansetron Injectable 4 milliGRAM(s) IV Push every 8 hours PRN Nausea and/or Vomiting            T(F): 96 (23 @ 10:23), Max: 96 (23 @ 10:23)  HR: 76 (23 @ 10:23) (76 - 76)  BP: 112/75 (23 @ 10:23) (112/75 - 112/75)  RR: 18 (23 @ 10:23) (18 - 18)  SpO2: 98% (23 @ 10:23) (98% - 98%)        Neurologic Examination:  General:  Appearance is consistent with chronologic age.  No abnormal facies.   General: The patient is oriented to person, place, time and date.  Recent and remote memory intact.  Follows 3-4-step directions. Fund of knowledge is intact and normal.  Language with normal repetition, comprehension and naming, some questions need to be repeated.  Nondysarthric.    Cranial nerves: EOMI w/o nystagmus, skew or reported double vision.  PERRL.  No ptosis/weakness of eyelid closure.  Facial sensation is normal with normal bite.  No facial asymmetry.  Hearing grossly intact b/l.  Palate elevates midline.  Tongue midline.  Motor examination:   Normal tone, bulk and range of motion.  No tenderness, twitching, tremors or involuntary movements.  Formal Muscle Strength Testin/5 UE; 5/5 LE.  No observable drift.  Reflexes:   2+ b/l   Sensory examination:   Intact to light touch and pinprick, pain.  Cerebellum:   FTN/HKS intact with normal RODRIGO in all limbs.  No dysmetria or dysdiadokinesia.  Gait narrow based and normal.        Labs:   Pregnancy Profile, Urine: Negative (09-11-23 @ 10:10)    2023 - Keppra 29.7 mcg/ml, Lamictal 8/0 mcg/ml (random levels)              Neuroimaging:  MRI brain at [facility] 3/16/2021 - 5mm curvilinear T1 increased signal along posterior margin of the fornices at the level of the foramen of bone marrow. 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 Colloid cyst is less likely but cannot be excluded.          VEEG 3/17 - 3/19/2021 - large number of generalized spikes  and polys pikes and wave.This activity at times appears in rhythmic/regular 3 hz activity that could last for few seconds.. These runs were more frequently seen during the HV and PS and also early am and late PM  REEG 2021 - normal  REEG 10/17/2019 - frequent generalized spike and wave complexes  VEEG 3/19 - 3/20/2015 - small number of 3 Hz generalized spikes and after going slow          Assessment:  This is a 27y Female with h/o primary generalized epilepsy, s/p breakthrough seizure about 2 weeks ago. Patient's seizures appeared to have catamenial pattern.        Discussed with Dr. Acosta        Plan:   - VEEG monitoring for further characterization and treatment plan  - Seizure precautions  - COntinue home doses of seizure medications, use patient's own supply (ordered, NFF completed, discussed with RN and pharmacy)  - Ativan 2mg IV PRN for generalized tonic-clonic seizure lasting longer than 2 minutes, or two consecutive seizures without return to baseline in-between (ordered)  - CBC, CMP, Mg, ASM levels trough (ordered)  - Keep Mg above 2    Plan discussed with patient and mother in details, all questions answered.    Discussed with nursing and medical teams. Neurology/Epilepsy Consult:    CAROL SHORE 27y Female  MRN-[id number]    Patient is a 27y old left-handed Female who presents for elective VEEG        HPI: History obtained from patient and mother. EMR and outpatient records reviewed.  Patient has h/o epilepsy diagnosed in 2015 when she had first witnessed GTC event preceded by myoclonic jerking of upper extremities. She was admitted to EMU for VEEG, started on seizure medicaiton. She is being followed as outpatient by Dr. Acosta.  Since then patient continued having occasional breakthrough GTC seizures and her medication doses were adjusted over the years. Most recent event happened about 2 weeks ago at home, witnessed by parents. In the morning patient woke up, took her medications as always, and walked her twin brother to the door. She returned to her room and wanted to isabela back to sleep since went to bed late the night prior, but does not recall what happened next. Per parents, they saw on home camera that she had GTC seizure lasting about 1 minute followed by lethargy.   Previously patient had GTC events in 2020, 2021, 2023 and her seizures appeared to have catamenial pattern, therefore acetazolamide was added to her regimen. She is taking it during menses, as well as for 3 days before and after. LMP 2023.  She reports rare isolated myoclonic arms jerking. Her sleep pattern is not regular. Patient never had episodes of staring/behavioral arrest.  Patient is compliant with medications, tolerating well, no missed doses reported. Her Lamictal dose was increased by 50mg after most recent seizure.          PAST MEDICAL & SURGICAL HISTORY:  Epilepsy          FAMILY HISTORY:  Mother - epilepsy  Brother - autism, seizure x 1  Aunt - CP        Social History:  Lives with parents  Attends college, takes classes remotely        Risk factors:  Born 37 wk gestation, , twin B, BW 5lb 7oz, no complications  Early developmental delays, PT/OT/ST  No febrile seizures/CNS infections  No head trauma with loss of consciousness        Allergy:  No Known Allergies        Home Medications:  Lamictal XR 100mg at 8 am  Lamictal XR 300mg at 9 pm  Lamictal XR 50mg at 12 noon & 9pm  Keppra  mg at 8 am  Keppra XR 1000 mg at 9 pm  Acetazolamide 125mg q12hrs during menses  cholecalciferol oral tablet: 1000 unit(s) orally once a day  Folic acid  Iron        MEDICATIONS  (STANDING):  lamoTRIgine ER 50 milliGRAM(s) Oral <User Schedule>    MEDICATIONS  (PRN):  acetaminophen     Tablet .. 650 milliGRAM(s) Oral every 6 hours PRN Temp greater or equal to 38C (100.4F), Mild Pain (1 - 3)  aluminum hydroxide/magnesium hydroxide/simethicone Suspension 30 milliLiter(s) Oral every 4 hours PRN Dyspepsia  LORazepam   Injectable 2 milliGRAM(s) IV Push three times a day PRN generalized tonic-clonic seizure lasting longer than 2 minutes, or two consecutive seizures without return to baseline in-between  ondansetron Injectable 4 milliGRAM(s) IV Push every 8 hours PRN Nausea and/or Vomiting            T(F): 96 (23 @ 10:23), Max: 96 (23 @ 10:23)  HR: 76 (23 @ 10:23) (76 - 76)  BP: 112/75 (23 @ 10:23) (112/75 - 112/75)  RR: 18 (23 @ 10:23) (18 - 18)  SpO2: 98% (23 @ 10:23) (98% - 98%)        Neurologic Examination:  General:  Appearance is consistent with chronologic age.  No abnormal facies.   General: The patient is oriented to person, place, time and date.  Recent and remote memory intact.  Follows 3-4-step directions. Fund of knowledge is intact and normal.  Language with normal repetition, comprehension and naming, some questions need to be repeated.  Nondysarthric.    Cranial nerves: EOMI w/o nystagmus, skew or reported double vision.  PERRL.  No ptosis/weakness of eyelid closure.  Facial sensation is normal with normal bite.  No facial asymmetry.  Hearing grossly intact b/l.  Palate elevates midline.  Tongue midline.  Motor examination:   Normal tone, bulk and range of motion.  No tenderness, twitching, tremors or involuntary movements.  Formal Muscle Strength Testin/5 UE; 5/5 LE.  No observable drift.  Reflexes:   2+ b/l   Sensory examination:   Intact to light touch and pinprick, pain.  Cerebellum:   FTN/HKS intact with normal RODRIGO in all limbs.  No dysmetria or dysdiadokinesia.  Gait narrow based and normal.        Labs:   Pregnancy Profile, Urine: Negative (23 @ 10:10)    2023 - Keppra 29.7 mcg/ml, Lamictal 8/0 mcg/ml (random levels)              Neuroimaging:  MRI brain at [facility] 3/16/2021 - 5mm curvilinear T1 increased signal along posterior margin of the fornices at the level of the foramen of bone marrow. 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 Colloid cyst is less likely but cannot be excluded.          VEEG 3/17 - 3/19/2021 - large number of generalized spikes  and polys pikes and wave.This activity at times appears in rhythmic/regular 3 hz activity that could last for few seconds.. These runs were more frequently seen during the HV and PS and also early am and late PM  REEG 2021 - normal  REEG 10/17/2019 - frequent generalized spike and wave complexes  VEEG 3/19 - 3/20/2015 - small number of 3 Hz generalized spikes and after going slow          Assessment:  This is a 27y Female with h/o primary generalized epilepsy, s/p breakthrough seizure about 2 weeks ago. Patient's seizures appeared to have catamenial pattern.        Discussed with Dr. Acosta        Plan:   - VEEG monitoring for further characterization and treatment plan  - Seizure precautions  - COntinue home doses of seizure medications, use patient's own supply (ordered, NFF completed, discussed with RN and pharmacy)  - Ativan 2mg IV PRN for generalized tonic-clonic seizure lasting longer than 2 minutes, or two consecutive seizures without return to baseline in-between (ordered)  - CBC, CMP, Mg, ASM levels trough (ordered)  - Keep Mg above 2    Plan discussed with patient and mother in details, all questions answered.    Discussed with nursing and medical teams. Neurology/Epilepsy Consult:    CAROL SHORE 27y Female  MRN-[id number]    Patient is a 27y old left-handed Female who presents for elective VEEG        HPI: History obtained from patient and mother. EMR and outpatient records reviewed.  Patient has h/o epilepsy diagnosed in 2015 when she had first witnessed GTC event preceded by myoclonic jerking of upper extremities. She was admitted to EMU for VEEG, started on seizure medicaiton. She is being followed as outpatient by Dr. Acosta.  Since then patient continued having occasional breakthrough GTC seizures and her medication doses were adjusted over the years. Most recent event happened about 2 weeks ago at home, witnessed by parents. In the morning patient woke up, took her medications as always, and walked her twin brother to the door. She returned to her room and wanted to isabela back to sleep since went to bed late the night prior, but does not recall what happened next. Per parents, they saw on home camera that she had GTC seizure lasting about 1 minute followed by lethargy.   Previously patient had GTC events in 2020, 2021, 2023 and her seizures appeared to have catamenial pattern, therefore acetazolamide was added to her regimen. She is taking it during menses, as well as for 3 days before and after. LMP 2023.  She reports rare isolated myoclonic arms jerking. Her sleep pattern is not regular. Patient never had episodes of staring/behavioral arrest.  Patient is compliant with medications, tolerating well, no missed doses reported. Her Lamictal dose was increased by 50mg after most recent seizure.          PAST MEDICAL & SURGICAL HISTORY:  Epilepsy          FAMILY HISTORY:  Mother - epilepsy  Brother - autism, seizure x 1  Aunt - CP        Social History:  Lives with parents  Attends college, takes classes remotely        Risk factors:  Born 37 wk gestation, , twin B, BW 5lb 7oz, no complications  Early developmental delays, PT/OT/ST  No febrile seizures/CNS infections  No head trauma with loss of consciousness        Allergy:  No Known Allergies        Home Medications:  Lamictal XR 100mg at 8 am  Lamictal XR 300mg at 9 pm  Lamictal XR 50mg at 12 noon & 9pm  Keppra  mg at 8 am  Keppra XR 1000 mg at 9 pm  Acetazolamide 125mg q12hrs during menses  cholecalciferol oral tablet: 1000 unit(s) orally once a day  Folic acid  Iron        MEDICATIONS  (STANDING):  lamoTRIgine ER 50 milliGRAM(s) Oral <User Schedule>    MEDICATIONS  (PRN):  acetaminophen     Tablet .. 650 milliGRAM(s) Oral every 6 hours PRN Temp greater or equal to 38C (100.4F), Mild Pain (1 - 3)  aluminum hydroxide/magnesium hydroxide/simethicone Suspension 30 milliLiter(s) Oral every 4 hours PRN Dyspepsia  LORazepam   Injectable 2 milliGRAM(s) IV Push three times a day PRN generalized tonic-clonic seizure lasting longer than 2 minutes, or two consecutive seizures without return to baseline in-between  ondansetron Injectable 4 milliGRAM(s) IV Push every 8 hours PRN Nausea and/or Vomiting            T(F): 96 (23 @ 10:23), Max: 96 (23 @ 10:23)  HR: 76 (23 @ 10:23) (76 - 76)  BP: 112/75 (23 @ 10:23) (112/75 - 112/75)  RR: 18 (23 @ 10:23) (18 - 18)  SpO2: 98% (23 @ 10:23) (98% - 98%)        Neurologic Examination:  General:  Appearance is consistent with chronologic age.  No abnormal facies.   General: The patient is oriented to person, place, time and date.  Recent and remote memory intact.  Follows 3-4-step directions. Fund of knowledge is intact and normal.  Language with normal repetition, comprehension and naming, some questions need to be repeated.  Nondysarthric.    Cranial nerves: EOMI w/o nystagmus, skew or reported double vision.  PERRL.  No ptosis/weakness of eyelid closure.  Facial sensation is normal with normal bite.  No facial asymmetry.  Hearing grossly intact b/l.  Palate elevates midline.  Tongue midline.  Motor examination:   Normal tone, bulk and range of motion.  No tenderness, twitching, tremors or involuntary movements.  Formal Muscle Strength Testin/5 UE; 5/5 LE.  No observable drift.  Reflexes:   2+ b/l   Sensory examination:   Intact to light touch and pinprick, pain.  Cerebellum:   FTN/HKS intact with normal RODRIGO in all limbs.  No dysmetria or dysdiadokinesia.  Gait narrow based and normal.        Labs:   Pregnancy Profile, Urine: Negative (23 @ 10:10)    2023 - Keppra 29.7 mcg/ml, Lamictal 8/0 mcg/ml (random levels)              Neuroimaging:  MRI brain at [facility] 3/16/2021 - 5mm curvilinear T1 increased signal along posterior margin of the fornices at the level of the foramen of bone marrow. 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 Colloid cyst is less likely but cannot be excluded.          REEG 2023 - short bursts of generalized polyspike wave  VEEG 3/17 - 3/19/2021 - large number of generalized spikes  and polys pikes and wave. This activity at times appears in rhythmic/regular 3 hz activity that could last for few seconds.. These runs were more frequently seen during the HV and PS and also early am and late PM  REEG 2021 - normal  REEG 10/17/2019 - frequent generalized spike and wave complexes  VEEG 3/19 - 3/20/2015 - small number of 3 Hz generalized spikes and after going slow          Assessment:  This is a 27y Female with h/o primary generalized epilepsy, s/p breakthrough seizure about 2 weeks ago. Patient's seizures appear to have catamenial pattern.        Discussed with Dr. Acosta        Plan:   - VEEG monitoring for further characterization and treatment plan  - Seizure precautions  - COntinue home doses of seizure medications, use patient's own supply (ordered, NFF completed, discussed with RN and pharmacy)  - Ativan 2mg IV PRN for generalized tonic-clonic seizure lasting longer than 2 minutes, or two consecutive seizures without return to baseline in-between (ordered)  - CBC, CMP, Mg, ASM levels trough (ordered)  - Keep Mg above 2    Plan discussed with patient and mother in details, all questions answered.    Discussed with nursing and medical teams.

## 2023-09-11 NOTE — H&P ADULT - ASSESSMENT
27F w/PMHx of generalized tonic/clonic seizures presents with mother for V-EEG, admitted to medicine service for further management.      #Seizure Disorder  - admit to medicine  - Neurology c/s for V-EEG monitoring  - Seizure Precautions  - Alprazolam 2mg IVP PRN seizure like activity  - AED's per Neuro  - Labs per Neuro  27F w/PMHx of generalized tonic/clonic seizures presents with mother for V-EEG, admitted to medicine service for further management.      #Seizure Disorder  - admit to medicine  - Neurology c/s for V-EEG monitoring  - Seizure Precautions  - Lorazepam 2mg IVP PRN seizure like activity  - AED's per Neuro  - Labs per Neuro

## 2023-09-11 NOTE — PATIENT PROFILE ADULT - FALL HARM RISK - HARM RISK INTERVENTIONS

## 2023-09-11 NOTE — H&P ADULT - NSHPPHYSICALEXAM_GEN_ALL_CORE
Vital Signs (24 Hrs):  T(C): 35.6 (09-11-23 @ 10:23), Max: 35.6 (09-11-23 @ 10:23)  HR: 76 (09-11-23 @ 10:23) (76 - 76)  BP: 112/75 (09-11-23 @ 10:23) (112/75 - 112/75)  RR: 18 (09-11-23 @ 10:23) (18 - 18)  SpO2: 98% (09-11-23 @ 10:23) (98% - 98%)    PHYSICAL EXAM:  GENERAL: NAD, well-developed  SKIN: No rashes or lesions  HEAD:  Atraumatic, Normocephalic  EYES: EOMI, PERRLA, conjunctiva and sclera clear  NECK: Supple, No JVD  CHEST/LUNG: Clear to auscultation bilaterally; No wheeze  HEART: Regular rate and rhythm; No murmurs, rubs, or gallops  ABDOMEN: Soft, Nontender, Nondistended; Bowel sounds present  EXTREMITIES:  No clubbing, cyanosis, or edema  CNS: AAOx3

## 2023-09-11 NOTE — CONSULT NOTE ADULT - NS ATTEND AMEND GEN_ALL_CORE FT
Agree with history and physical exam.  Patient is here for further characterization and medication adjustment (timing of the medications) due to having some breakthrough seizures.  Will start monitoring.  Seizure precautions.  Continue current medications for now, sent trough levels.

## 2023-09-12 PROCEDURE — 99231 SBSQ HOSP IP/OBS SF/LOW 25: CPT

## 2023-09-12 PROCEDURE — 95720 EEG PHY/QHP EA INCR W/VEEG: CPT

## 2023-09-12 RX ORDER — MAGNESIUM OXIDE 400 MG ORAL TABLET 241.3 MG
400 TABLET ORAL
Refills: 0 | Status: DISCONTINUED | OUTPATIENT
Start: 2023-09-12 | End: 2023-09-13

## 2023-09-12 RX ADMIN — MAGNESIUM OXIDE 400 MG ORAL TABLET 400 MILLIGRAM(S): 241.3 TABLET ORAL at 08:16

## 2023-09-12 RX ADMIN — LAMOTRIGINE 50 MILLIGRAM(S): 25 TABLET, ORALLY DISINTEGRATING ORAL at 12:16

## 2023-09-12 RX ADMIN — Medication 650 MILLIGRAM(S): at 12:31

## 2023-09-12 RX ADMIN — CHLORHEXIDINE GLUCONATE 1 APPLICATION(S): 213 SOLUTION TOPICAL at 05:34

## 2023-09-12 RX ADMIN — LEVETIRACETAM 500 MILLIGRAM(S): 250 TABLET, FILM COATED ORAL at 08:19

## 2023-09-12 RX ADMIN — MAGNESIUM OXIDE 400 MG ORAL TABLET 400 MILLIGRAM(S): 241.3 TABLET ORAL at 12:16

## 2023-09-12 RX ADMIN — Medication 1000 UNIT(S): at 12:15

## 2023-09-12 RX ADMIN — Medication 650 MILLIGRAM(S): at 13:01

## 2023-09-12 RX ADMIN — Medication 1 MILLIGRAM(S): at 12:15

## 2023-09-12 RX ADMIN — LAMOTRIGINE 50 MILLIGRAM(S): 25 TABLET, ORALLY DISINTEGRATING ORAL at 21:34

## 2023-09-12 RX ADMIN — LEVETIRACETAM 1000 MILLIGRAM(S): 250 TABLET, FILM COATED ORAL at 21:34

## 2023-09-12 RX ADMIN — LAMOTRIGINE 100 MILLIGRAM(S): 25 TABLET, ORALLY DISINTEGRATING ORAL at 08:19

## 2023-09-12 RX ADMIN — MAGNESIUM OXIDE 400 MG ORAL TABLET 400 MILLIGRAM(S): 241.3 TABLET ORAL at 18:18

## 2023-09-12 NOTE — CHART NOTE - NSCHARTNOTEFT_GEN_A_CORE
Vital Signs Last 24 Hrs    T(F): 96.8 (12 Sep 2023 05:10), Max: 97.5 (11 Sep 2023 13:48)  HR: 62 (12 Sep 2023 05:10) (62 - 73)  BP: 105/55 (12 Sep 2023 05:10) (96/57 - 109/54)  RR: 18 (12 Sep 2023 05:10) (18 - 18)  SpO2: 98% (11 Sep 2023 20:20) (98% - 99%)    No labs for today  - receiving Mag Oxide for Hypomagnesemia  - repeat labs in AM including magnesium level

## 2023-09-12 NOTE — PROGRESS NOTE ADULT - SUBJECTIVE AND OBJECTIVE BOX
Epilepsy Attending Note:     SILVIANOCAROL    27y Female  MRN [id number]    Vital Signs Last 24 Hrs  T(C): 36 (12 Sep 2023 05:10), Max: 36.4 (11 Sep 2023 13:48)  T(F): 96.8 (12 Sep 2023 05:10), Max: 97.5 (11 Sep 2023 13:48)  HR: 62 (12 Sep 2023 05:10) (62 - 73)  BP: 105/55 (12 Sep 2023 05:10) (96/57 - 109/54)  BP(mean): --  RR: 18 (12 Sep 2023 05:10) (18 - 18)  SpO2: 98% (11 Sep 2023 20:20) (98% - 99%)    Parameters below as of 11 Sep 2023 13:48  Patient On (Oxygen Delivery Method): room air                              14.3   5.65  )-----------( 141      ( 11 Sep 2023 19:19 )             42.1       09-11    142  |  105  |  11  ----------------------------<  129<H>  3.6   |  25  |  0.7    Ca    9.1      11 Sep 2023 19:19  Mg     1.7     09-11    TPro  6.1  /  Alb  4.3  /  TBili  0.4  /  DBili  x   /  AST  21  /  ALT  19  /  AlkPhos  66  09-11      MEDICATIONS  (STANDING):  chlorhexidine 2% Cloths 1 Application(s) Topical <User Schedule>  cholecalciferol 1000 Unit(s) Oral every 24 hours  folic acid 1 milliGRAM(s) Oral every 24 hours  influenza   Vaccine 0.5 milliLiter(s) IntraMuscular once  lamoTRIgine ER 50 milliGRAM(s) Oral <User Schedule>  lamoTRIgine  milliGRAM(s) Oral <User Schedule>  lamotrigine  milliGRAM(s) 1 Tablet(s) Oral <User Schedule>  levETIRAcetam ER 1000 milliGRAM(s) Oral <User Schedule>  levETIRAcetam  milliGRAM(s) Oral <User Schedule>  magnesium oxide 400 milliGRAM(s) Oral three times a day with meals    MEDICATIONS  (PRN):  acetaminophen     Tablet .. 650 milliGRAM(s) Oral every 6 hours PRN Temp greater or equal to 38C (100.4F), Mild Pain (1 - 3)  aluminum hydroxide/magnesium hydroxide/simethicone Suspension 30 milliLiter(s) Oral every 4 hours PRN Dyspepsia  LORazepam   Injectable 2 milliGRAM(s) IV Push three times a day PRN generalized tonic-clonic seizure lasting longer than 2 minutes, or two consecutive seizures without return to baseline in-between  ondansetron Injectable 4 milliGRAM(s) IV Push every 8 hours PRN Nausea and/or Vomiting            VEEG in the last 24 hours:    Background - continuous, symmetrical, well organized, reaching frequencies in the range of 8-9 Hz    Focal and generalized slowing - none    Interictal activity - frequent to very frequent isolated generalized spikes or clusters of generalized 3-5 Hz spike/polyspike and wave discharges that at times presented in brief runs lasting 3-4 seconds.    Events - none    Seizures - none    Impression: Abnormal VEEG as above    Plan -   Will continue monitoring for further characterization  Seizure precautions  No change in medications for now

## 2023-09-12 NOTE — PROGRESS NOTE ADULT - SUBJECTIVE AND OBJECTIVE BOX
27F w/PMHx of generalized tonic/clonic seizures presents with mother for V-EEG.  Patient reports Hx of epilepsy, last seizure approx 3 weeks ago but usually has approx 2-3 per year.  Last prior was 11/22.  Reports usually has yearly V-eeg monitoring but since COVID hasn't had one in several years so sent in for eval.  Patient denies any acute complaints, no CP/SOB.  No abdominal or urinary complaints.  Medications reviewed with patient and mother.    PAST MEDICAL & SURGICAL HISTORY:    Epilepsy    No significant past surgical history    Social History:    nonsmoker     MEDICATIONS  (STANDING):  chlorhexidine 2% Cloths 1 Application(s) Topical <User Schedule>  cholecalciferol 1000 Unit(s) Oral every 24 hours  folic acid 1 milliGRAM(s) Oral every 24 hours  influenza   Vaccine 0.5 milliLiter(s) IntraMuscular once  lamoTRIgine  milliGRAM(s) Oral <User Schedule>  lamoTRIgine ER 50 milliGRAM(s) Oral <User Schedule>  lamotrigine  milliGRAM(s) 1 Tablet(s) Oral <User Schedule>  levETIRAcetam  milliGRAM(s) Oral <User Schedule>  levETIRAcetam ER 1000 milliGRAM(s) Oral <User Schedule>    MEDICATIONS  (PRN):  acetaminophen     Tablet .. 650 milliGRAM(s) Oral every 6 hours PRN Temp greater or equal to 38C (100.4F), Mild Pain (1 - 3)  aluminum hydroxide/magnesium hydroxide/simethicone Suspension 30 milliLiter(s) Oral every 4 hours PRN Dyspepsia  LORazepam   Injectable 2 milliGRAM(s) IV Push three times a day PRN generalized tonic-clonic seizure lasting longer than 2 minutes, or two consecutive seizures without return to baseline in-between  ondansetron Injectable 4 milliGRAM(s) IV Push every 8 hours PRN Nausea and/or Vomiting    Allergies    No Known Allergies    Intolerances    Vital Signs Last 24 Hrs  T(C): 36 (12 Sep 2023 05:10), Max: 36.4 (11 Sep 2023 13:48)  T(F): 96.8 (12 Sep 2023 05:10), Max: 97.5 (11 Sep 2023 13:48)  HR: 62 (12 Sep 2023 05:10) (62 - 76)  BP: 105/55 (12 Sep 2023 05:10) (96/57 - 112/75)  BP(mean): --  RR: 18 (12 Sep 2023 05:10) (18 - 18)  SpO2: 98% (11 Sep 2023 20:20) (98% - 99%)    Parameters below as of 11 Sep 2023 13:48  Patient On (Oxygen Delivery Method): room air    PHYSICAL EXAM:     GENERAL: NAD, well-developed  SKIN: No rashes or lesions  HEAD:  Atraumatic, Normocephalic  EYES: EOMI, PERRLA, conjunctiva and sclera clear  NECK: Supple, No JVD  CHEST/LUNG: Clear to auscultation bilaterally; No wheeze  HEART: Regular rate and rhythm; No murmurs, rubs, or gallops  ABDOMEN: Soft, Nontender, Nondistended; Bowel sounds present  EXTREMITIES:  No clubbing, cyanosis, or edema  CNS: AAOx3    LABS:                           14.3   5.65  )-----------( 141      ( 11 Sep 2023 19:19 )             42.1   09-11    142  |  105  |  11  ----------------------------<  129<H>  3.6   |  25  |  0.7    Ca    9.1      11 Sep 2023 19:19  Mg     1.7     09-11    TPro  6.1  /  Alb  4.3  /  TBili  0.4  /  DBili  x   /  AST  21  /  ALT  19  /  AlkPhos  66  09-11

## 2023-09-13 ENCOUNTER — TRANSCRIPTION ENCOUNTER (OUTPATIENT)
Age: 28
End: 2023-09-13

## 2023-09-13 VITALS
HEART RATE: 62 BPM | DIASTOLIC BLOOD PRESSURE: 58 MMHG | TEMPERATURE: 97 F | SYSTOLIC BLOOD PRESSURE: 99 MMHG | RESPIRATION RATE: 16 BRPM

## 2023-09-13 LAB
ANION GAP SERPL CALC-SCNC: 10 MMOL/L — SIGNIFICANT CHANGE UP (ref 7–14)
BUN SERPL-MCNC: 12 MG/DL — SIGNIFICANT CHANGE UP (ref 10–20)
CALCIUM SERPL-MCNC: 9.5 MG/DL — SIGNIFICANT CHANGE UP (ref 8.4–10.5)
CHLORIDE SERPL-SCNC: 102 MMOL/L — SIGNIFICANT CHANGE UP (ref 98–110)
CO2 SERPL-SCNC: 29 MMOL/L — SIGNIFICANT CHANGE UP (ref 17–32)
CREAT SERPL-MCNC: 0.8 MG/DL — SIGNIFICANT CHANGE UP (ref 0.7–1.5)
EGFR: 104 ML/MIN/1.73M2 — SIGNIFICANT CHANGE UP
GLUCOSE SERPL-MCNC: 100 MG/DL — HIGH (ref 70–99)
LAMOTRIGINE SERPL-MCNC: 9.6 UG/ML — SIGNIFICANT CHANGE UP (ref 2–20)
LEVETIRACETAM SERPL-MCNC: 11.6 UG/ML — SIGNIFICANT CHANGE UP (ref 10–40)
MAGNESIUM SERPL-MCNC: 2.1 MG/DL — SIGNIFICANT CHANGE UP (ref 1.8–2.4)
POTASSIUM SERPL-MCNC: 4.2 MMOL/L — SIGNIFICANT CHANGE UP (ref 3.5–5)
POTASSIUM SERPL-SCNC: 4.2 MMOL/L — SIGNIFICANT CHANGE UP (ref 3.5–5)
SODIUM SERPL-SCNC: 141 MMOL/L — SIGNIFICANT CHANGE UP (ref 135–146)

## 2023-09-13 PROCEDURE — 95720 EEG PHY/QHP EA INCR W/VEEG: CPT

## 2023-09-13 PROCEDURE — 99231 SBSQ HOSP IP/OBS SF/LOW 25: CPT | Mod: 25

## 2023-09-13 RX ORDER — LAMOTRIGINE 25 MG/1
1 TABLET, ORALLY DISINTEGRATING ORAL
Qty: 30 | Refills: 5
Start: 2023-09-13 | End: 2024-03-10

## 2023-09-13 RX ORDER — LAMOTRIGINE 25 MG/1
1 TABLET, ORALLY DISINTEGRATING ORAL
Qty: 60 | Refills: 5
Start: 2023-09-13 | End: 2024-03-10

## 2023-09-13 RX ORDER — LEVETIRACETAM 250 MG/1
1 TABLET, FILM COATED ORAL
Qty: 60 | Refills: 5
Start: 2023-09-13 | End: 2024-03-10

## 2023-09-13 RX ORDER — FERROUS SULFATE 325(65) MG
65 TABLET ORAL
Refills: 0 | DISCHARGE

## 2023-09-13 RX ORDER — LEVETIRACETAM 250 MG/1
1 TABLET, FILM COATED ORAL
Qty: 30 | Refills: 5
Start: 2023-09-13 | End: 2024-03-10

## 2023-09-13 RX ORDER — LAMOTRIGINE 25 MG/1
1 TABLET, ORALLY DISINTEGRATING ORAL
Qty: 90 | Refills: 3
Start: 2023-09-13 | End: 2024-09-06

## 2023-09-13 RX ORDER — FOLIC ACID 0.8 MG
1 TABLET ORAL
Qty: 0 | Refills: 0 | DISCHARGE
Start: 2023-09-13

## 2023-09-13 RX ADMIN — MAGNESIUM OXIDE 400 MG ORAL TABLET 400 MILLIGRAM(S): 241.3 TABLET ORAL at 08:33

## 2023-09-13 RX ADMIN — Medication 1000 UNIT(S): at 12:01

## 2023-09-13 RX ADMIN — LEVETIRACETAM 500 MILLIGRAM(S): 250 TABLET, FILM COATED ORAL at 08:32

## 2023-09-13 RX ADMIN — MAGNESIUM OXIDE 400 MG ORAL TABLET 400 MILLIGRAM(S): 241.3 TABLET ORAL at 12:01

## 2023-09-13 RX ADMIN — LAMOTRIGINE 100 MILLIGRAM(S): 25 TABLET, ORALLY DISINTEGRATING ORAL at 08:32

## 2023-09-13 RX ADMIN — Medication 1 MILLIGRAM(S): at 12:00

## 2023-09-13 RX ADMIN — CHLORHEXIDINE GLUCONATE 1 APPLICATION(S): 213 SOLUTION TOPICAL at 05:44

## 2023-09-13 RX ADMIN — LAMOTRIGINE 50 MILLIGRAM(S): 25 TABLET, ORALLY DISINTEGRATING ORAL at 12:02

## 2023-09-13 NOTE — DISCHARGE NOTE PROVIDER - PROVIDER TOKENS
PROVIDER:[TOKEN:[[id number]],SCHEDULEDAPPT:[03/21/2024],SCHEDULEDAPPTTIME:[02:00 PM]],PROVIDER:[TOKEN:[[id number]],FOLLOWUP:[1 week]]

## 2023-09-13 NOTE — DISCHARGE NOTE PROVIDER - NSDCMRMEDTOKEN_GEN_ALL_CORE_FT
cholecalciferol oral tablet: 1000 unit(s) orally once a day  folic acid 1 mg oral tablet: 1 tab(s) orally every 24 hours  Keppra  mg oral tablet, extended release: 1 tab(s) orally every 12 hours  Keppra  mg oral tablet, extended release: 1 tab(s) orally once a day (at bedtime)  LaMICtal  mg oral tablet, extended release: 1 tab(s) orally once a day at 8am  LaMICtal  mg oral tablet, extended release: 1 tab(s) orally once a day (at bedtime)  LaMICtal XR 50 mg oral tablet, extended release: 1 tab(s) orally 2 times a day at 12 noon &amp; 9pm

## 2023-09-13 NOTE — PROGRESS NOTE ADULT - SUBJECTIVE AND OBJECTIVE BOX
27F w/PMHx of generalized tonic/clonic seizures presents with mother for V-EEG.  Patient reports Hx of epilepsy, last seizure approx 3 weeks ago but usually has approx 2-3 per year.  Last prior was 11/22.  Reports usually has yearly V-eeg monitoring but since COVID hasn't had one in several years so sent in for eval.  Patient denies any acute complaints, no CP/SOB.  No abdominal or urinary complaints.  Medications reviewed with patient and mother.    PAST MEDICAL & SURGICAL HISTORY:    Epilepsy    No significant past surgical history    Social History:    nonsmoker     MEDICATIONS  (STANDING):  chlorhexidine 2% Cloths 1 Application(s) Topical <User Schedule>  cholecalciferol 1000 Unit(s) Oral every 24 hours  folic acid 1 milliGRAM(s) Oral every 24 hours  influenza   Vaccine 0.5 milliLiter(s) IntraMuscular once  lamoTRIgine  milliGRAM(s) Oral <User Schedule>  lamoTRIgine ER 50 milliGRAM(s) Oral <User Schedule>  lamotrigine  milliGRAM(s) 1 Tablet(s) Oral <User Schedule>  levETIRAcetam  milliGRAM(s) Oral <User Schedule>  levETIRAcetam ER 1000 milliGRAM(s) Oral <User Schedule>    MEDICATIONS  (PRN):  acetaminophen     Tablet .. 650 milliGRAM(s) Oral every 6 hours PRN Temp greater or equal to 38C (100.4F), Mild Pain (1 - 3)  aluminum hydroxide/magnesium hydroxide/simethicone Suspension 30 milliLiter(s) Oral every 4 hours PRN Dyspepsia  LORazepam   Injectable 2 milliGRAM(s) IV Push three times a day PRN generalized tonic-clonic seizure lasting longer than 2 minutes, or two consecutive seizures without return to baseline in-between  ondansetron Injectable 4 milliGRAM(s) IV Push every 8 hours PRN Nausea and/or Vomiting    Allergies    No Known Allergies    Intolerances    Vital Signs Last 24 Hrs  T(C): 36.2 (13 Sep 2023 05:03), Max: 36.2 (13 Sep 2023 05:03)  T(F): 97.1 (13 Sep 2023 05:03), Max: 97.1 (13 Sep 2023 05:03)  HR: 62 (13 Sep 2023 05:03) (61 - 72)  BP: 99/58 (13 Sep 2023 05:03) (99/58 - 105/61)  BP(mean): --  RR: 16 (13 Sep 2023 05:03) (16 - 16)  SpO2: 98% (12 Sep 2023 20:30) (98% - 98%)        PHYSICAL EXAM:     GENERAL: NAD, well-developed  SKIN: No rashes or lesions  HEAD:  Atraumatic, Normocephalic  EYES: EOMI, PERRLA, conjunctiva and sclera clear  NECK: Supple, No JVD  CHEST/LUNG: Clear to auscultation bilaterally; No wheeze  HEART: Regular rate and rhythm; No murmurs, rubs, or gallops  ABDOMEN: Soft, Nontender, Nondistended; Bowel sounds present  EXTREMITIES:  No clubbing, cyanosis, or edema  CNS: AAOx3    LABS:                           14.3   5.65  )-----------( 141      ( 11 Sep 2023 19:19 )             42.1   09-11    142  |  105  |  11  ----------------------------<  129<H>  3.6   |  25  |  0.7    Ca    9.1      11 Sep 2023 19:19  Mg     1.7     09-11    TPro  6.1  /  Alb  4.3  /  TBili  0.4  /  DBili  x   /  AST  21  /  ALT  19  /  AlkPhos  66  09-11

## 2023-09-13 NOTE — DISCHARGE NOTE NURSING/CASE MANAGEMENT/SOCIAL WORK - PATIENT PORTAL LINK FT
You can access the FollowMyHealth Patient Portal offered by [facility] by registering at the following website: [email or web address]. By joining [facility]’s FollowMyHealth portal, you will also be able to view your health information using other applications (apps) compatible with our system.

## 2023-09-13 NOTE — DISCHARGE NOTE PROVIDER - NSDCCPCAREPLAN_GEN_ALL_CORE_FT
PRINCIPAL DISCHARGE DIAGNOSIS  Diagnosis: Seizure disorder  Assessment and Plan of Treatment: VEEG completed: Normal study, discharge to home today, follow up with Dr Acosta 3/21/2024  2 PM      SECONDARY DISCHARGE DIAGNOSES  Diagnosis: Hypomagnesemia  Assessment and Plan of Treatment: Repleted, Mg now 2.1

## 2023-09-13 NOTE — PROGRESS NOTE ADULT - ASSESSMENT
27F w/PMHx of generalized tonic/clonic seizures presents with mother for V-EEG, admitted to medicine service for further management.      #Seizure Disorder    - admit to medicine  - Neurology c/s for V-EEG monitoring  - Seizure Precautions  - Lorazepam 2mg IVP PRN seizure like activity  - AED's per Neuro  - Labs per Neuro     # hypomagnesium     - monitor and supplement MG    Disposition : Acute
27F w/PMHx of generalized tonic/clonic seizures presents with mother for V-EEG, admitted to medicine service for further management.      #Seizure Disorder    - admit to medicine  - Neurology c/s for V-EEG monitoring  - Seizure Precautions  - Lorazepam 2mg IVP PRN seizure like activity  - AED's per Neuro  - Labs per Neuro     # hypomagnesium     - monitor and supplement MG    Disposition : Acute

## 2023-09-13 NOTE — PROGRESS NOTE ADULT - SUBJECTIVE AND OBJECTIVE BOX
Epilepsy Attending Note:     ELMERCAROL FOSS    27y Female  MRN [id number]    Vital Signs Last 24 Hrs  T(C): 36.2 (13 Sep 2023 05:03), Max: 36.2 (13 Sep 2023 05:03)  T(F): 97.1 (13 Sep 2023 05:03), Max: 97.1 (13 Sep 2023 05:03)  HR: 62 (13 Sep 2023 05:03) (61 - 72)  BP: 99/58 (13 Sep 2023 05:03) (99/58 - 105/61)  BP(mean): --  RR: 16 (13 Sep 2023 05:03) (16 - 16)  SpO2: 98% (12 Sep 2023 20:30) (98% - 98%)                              14.3   5.65  )-----------( 141      ( 11 Sep 2023 19:19 )             42.1       09-11    142  |  105  |  11  ----------------------------<  129<H>  3.6   |  25  |  0.7    Ca    9.1      11 Sep 2023 19:19  Mg     1.7     09-11    TPro  6.1  /  Alb  4.3  /  TBili  0.4  /  DBili  x   /  AST  21  /  ALT  19  /  AlkPhos  66  09-11      MEDICATIONS  (STANDING):  chlorhexidine 2% Cloths 1 Application(s) Topical <User Schedule>  cholecalciferol 1000 Unit(s) Oral every 24 hours  folic acid 1 milliGRAM(s) Oral every 24 hours  influenza   Vaccine 0.5 milliLiter(s) IntraMuscular once  lamoTRIgine ER 50 milliGRAM(s) Oral <User Schedule>  lamoTRIgine  milliGRAM(s) Oral <User Schedule>  lamotrigine  milliGRAM(s) 1 Tablet(s) Oral <User Schedule>  levETIRAcetam  milliGRAM(s) Oral <User Schedule>  levETIRAcetam ER 1000 milliGRAM(s) Oral <User Schedule>  magnesium oxide 400 milliGRAM(s) Oral three times a day with meals    MEDICATIONS  (PRN):  acetaminophen     Tablet .. 650 milliGRAM(s) Oral every 6 hours PRN Temp greater or equal to 38C (100.4F), Mild Pain (1 - 3)  aluminum hydroxide/magnesium hydroxide/simethicone Suspension 30 milliLiter(s) Oral every 4 hours PRN Dyspepsia  LORazepam   Injectable 2 milliGRAM(s) IV Push three times a day PRN generalized tonic-clonic seizure lasting longer than 2 minutes, or two consecutive seizures without return to baseline in-between  ondansetron Injectable 4 milliGRAM(s) IV Push every 8 hours PRN Nausea and/or Vomiting            VEEG in the last 24 hours:    Background - continuous, symmetrical, well organized, reaching frequencies in the range of 8-9 Hz    Focal and generalized slowing - none    Interictal activity - frequent to very frequent isolated generalized spikes or clusters of generalized 3-5 Hz spike/polyspike and wave discharges that at times presented in brief runs lasting for 4-5 seconds.    Events - see below    Seizures - few of the longer runs of generalized discharges as described above were associated with eyelid fluttering and occasionally myoclonic jerks.    Impression: Abnormal VEEG as above    Plan -   Will discontinue monitoring  Increase Keppra XR to 500mg AM and 1250mg HS  May consider adding acetazolamide to mid-cycle as well.  Follow up as scheduled with levels

## 2023-09-13 NOTE — DISCHARGE NOTE NURSING/CASE MANAGEMENT/SOCIAL WORK - NSDCPEFALRISK_GEN_ALL_CORE
For information on Fall & Injury Prevention, visit: [email or web address] OR  [email or web address] OR  https://www.cdc.gov/steadi/patient.html

## 2023-09-13 NOTE — DISCHARGE NOTE PROVIDER - CARE PROVIDER_API CALL
Lc Acosta  Neurology  [address] [place] [address], [address] [place] [id number]  Phone: [phone]  Fax: [phone]  Scheduled Appointment: 03/21/2024 02:00 PM    Cristian Banuelos  Internal Medicine  [address] [place] [address], [facility] [address]  [place] [address]  Phone: [phone]  Fax: [phone]  Follow Up Time: 1 week

## 2023-09-13 NOTE — DISCHARGE NOTE PROVIDER - CARE PROVIDERS DIRECT ADDRESSES
[email or web address]

## 2023-09-13 NOTE — DISCHARGE NOTE PROVIDER - HOSPITAL COURSE
27F w/PMHx of generalized tonic/clonic seizures presents with mother for V-EEG, admitted to medicine service for further management.      #Seizure Disorder    - admit to medicine  - Neurology c/s for V-EEG monitoring  - Seizure Precautions  - AED's per Neuro  - medications adjusted  - new scripts sent to Pharmacy  - to follow up with Dr Acosta on 3/24/2024  2PM     # Hypomagnesemia : repleted now 2.1         27F w/PMHx of generalized tonic/clonic seizures presents with mother for V-EEG, admitted to medicine service for further management.      #Seizure Disorder    - admit to medicine  - Neurology c/s for V-EEG monitoring  - Seizure Precautions  - AED's per Neuro  - medications adjusted  - new scripts sent to Pharmacy  - to follow up with Dr Acosta on 3/21/2024  2PM     # Hypomagnesemia : repleted now 2.1

## 2023-09-13 NOTE — CHART NOTE - NSCHARTNOTEFT_GEN_A_CORE
pt reports she takes iron supplements of 65mg daily. Med rec updated accordingly pt reports she takes iron supplements, but unable to recall specific dose. Pt reports she will bring in supply to confirm dose

## 2023-09-13 NOTE — PROGRESS NOTE ADULT - SUBJECTIVE AND OBJECTIVE BOX
Epilepsy Team Discharge Note:    VEEG monitoring completed, patient is cleared for discharge from neurology standpoint.    Follow up with Dr. Acosta as previously scheduled 3/21/2024 at 2 pm.    [address] [place] [address], [address]  [phone]    Discharge seizure medications are:  Keppra XR 500mg in AM & 1250mg at bedtime (dose increased)  Lamictal XR 100mg in AM, 50mg at noon, 350mg at bedtime (pre-admission dose)    Rx sent to the pharmacy for brand name formulations.    Patient was also given Rx for CBC, CMP, ASM levels trough to be done prior to follow up.    Detailed written and verbal instructions regarding seizure precautions and follow up plan are given to the patient and mother, all questions answered. Patient and mom verbalized understanding.    Discussed with medical and nursing teams.   Epilepsy Team Discharge Note:    VEEG monitoring completed, patient is cleared for discharge from neurology standpoint.    Follow up with Dr. Acosta as previously scheduled 3/21/2024 at 2 pm.    [address] [place] [address], [address]  [phone]    Discharge seizure medications are:  Keppra XR 500mg in AM & 1250mg at bedtime (dose increased)  Lamictal XR 100mg in AM, 50mg at noon, 350mg at bedtime (pre-admission dose)  Continue Diamox 125mg q12hrs during menses, 3 days before and after    Rx sent to the pharmacy for brand name formulations.    Patient was also given Rx for CBC, CMP, ASM levels trough to be done prior to follow up.    Detailed written and verbal instructions regarding seizure precautions and follow up plan are given to the patient and mother, all questions answered. Patient and mom verbalized understanding.    Discussed with medical and nursing teams.

## 2023-09-13 NOTE — CHART NOTE - NSCHARTNOTEFT_GEN_A_CORE
To Whom It May Concern:    Please, be advised that Misty was admitted to [facility] 9/11 to 9/13/2023.  She is cleared to return to work/ To Whom It May Concern:    Please, be advised that Misty was admitted to [facility] 9/11 to 9/13/2023.  She is cleared to return to work.

## 2023-09-15 DIAGNOSIS — G40.909 EPILEPSY, UNSPECIFIED, NOT INTRACTABLE, WITHOUT STATUS EPILEPTICUS: ICD-10-CM

## 2023-09-15 DIAGNOSIS — E83.42 HYPOMAGNESEMIA: ICD-10-CM

## 2024-02-07 ENCOUNTER — RX RENEWAL (OUTPATIENT)
Age: 29
End: 2024-02-07

## 2024-03-08 RX ORDER — LAMOTRIGINE 300 MG/1
300 TABLET, FILM COATED, EXTENDED RELEASE ORAL
Qty: 90 | Refills: 1 | Status: ACTIVE | COMMUNITY
Start: 2023-03-17 | End: 1900-01-01

## 2024-03-08 RX ORDER — LAMOTRIGINE 50 MG/1
50 TABLET, FILM COATED, EXTENDED RELEASE ORAL
Qty: 180 | Refills: 1 | Status: ACTIVE | COMMUNITY
Start: 2023-08-28 | End: 1900-01-01

## 2024-03-14 RX ORDER — LEVETIRACETAM 750 MG/1
750 TABLET, EXTENDED RELEASE ORAL AT BEDTIME
Qty: 30 | Refills: 3 | Status: DISCONTINUED | COMMUNITY
Start: 2024-03-14 | End: 2024-03-14

## 2024-03-21 ENCOUNTER — APPOINTMENT (OUTPATIENT)
Dept: NEUROLOGY | Facility: CLINIC | Age: 29
End: 2024-03-21
Payer: MEDICAID

## 2024-03-21 VITALS
HEART RATE: 89 BPM | BODY MASS INDEX: 17.58 KG/M2 | OXYGEN SATURATION: 100 % | SYSTOLIC BLOOD PRESSURE: 110 MMHG | HEIGHT: 64 IN | WEIGHT: 103 LBS | DIASTOLIC BLOOD PRESSURE: 70 MMHG

## 2024-03-21 DIAGNOSIS — G40.309 GENERALIZED IDIOPATHIC EPILEPSY AND EPILEPTIC SYNDROMES, NOT INTRACTABLE, W/OUT STATUS EPILEPTICUS: ICD-10-CM

## 2024-03-21 PROCEDURE — 99213 OFFICE O/P EST LOW 20 MIN: CPT

## 2024-03-21 PROCEDURE — G2211 COMPLEX E/M VISIT ADD ON: CPT | Mod: NC,1L

## 2024-03-21 RX ORDER — LEVETIRACETAM 750 MG/1
750 TABLET, FILM COATED, EXTENDED RELEASE ORAL
Qty: 30 | Refills: 6 | Status: ACTIVE | COMMUNITY
Start: 2024-03-14 | End: 1900-01-01

## 2024-03-21 RX ORDER — LAMOTRIGINE 100 MG/1
100 TABLET, FILM COATED, EXTENDED RELEASE ORAL
Qty: 90 | Refills: 2 | Status: ACTIVE | COMMUNITY
Start: 1900-01-01 | End: 1900-01-01

## 2024-03-21 RX ORDER — ACETAZOLAMIDE 125 MG/1
125 TABLET ORAL TWICE DAILY
Qty: 60 | Refills: 6 | Status: ACTIVE | COMMUNITY
Start: 2021-01-13 | End: 1900-01-01

## 2024-03-21 RX ORDER — LEVETIRACETAM 500 MG/1
500 TABLET, FILM COATED, EXTENDED RELEASE ORAL
Qty: 60 | Refills: 6 | Status: ACTIVE | COMMUNITY
Start: 2021-03-26 | End: 1900-01-01

## 2024-03-21 NOTE — HISTORY OF PRESENT ILLNESS
[FreeTextEntry1] : Misty is here for the F/U She is doing well. No Sz. no events suggestive of SZ. She is sleeping better and mor consistently. 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 No HA NO joint pain Her menses are regular She sleeps well No issues with her mood saw PMD in [place]

## 2024-03-21 NOTE — PHYSICAL EXAM
[FreeTextEntry1] : A/A/Ox3  good attention mild psycho motor slowing follows 4 step commands No drift No dysmetria + fixation Rt> left + slight tremor right stable gait negative Romberg

## 2024-04-08 ENCOUNTER — TRANSCRIPTION ENCOUNTER (OUTPATIENT)
Age: 29
End: 2024-04-08

## 2024-04-22 NOTE — PHYSICAL EXAM
[FreeTextEntry1] : A/A/Ox3\par good mood\par not tired\par spontaneous and shy\par shows good attention \par + decreased dexterity and fixation\par stable gait\par no dysmetria\par No drift
coffee/tea

## 2024-10-04 ENCOUNTER — RX RENEWAL (OUTPATIENT)
Age: 29
End: 2024-10-04

## 2024-10-18 ENCOUNTER — APPOINTMENT (OUTPATIENT)
Dept: NEUROLOGY | Facility: CLINIC | Age: 29
End: 2024-10-18
Payer: MEDICAID

## 2024-10-18 ENCOUNTER — NON-APPOINTMENT (OUTPATIENT)
Age: 29
End: 2024-10-18

## 2024-10-18 VITALS
WEIGHT: 104 LBS | HEIGHT: 63 IN | DIASTOLIC BLOOD PRESSURE: 67 MMHG | RESPIRATION RATE: 19 BRPM | SYSTOLIC BLOOD PRESSURE: 115 MMHG | HEART RATE: 69 BPM | OXYGEN SATURATION: 99 % | BODY MASS INDEX: 18.43 KG/M2

## 2024-10-18 DIAGNOSIS — G40.309 GENERALIZED IDIOPATHIC EPILEPSY AND EPILEPTIC SYNDROMES, NOT INTRACTABLE, W/OUT STATUS EPILEPTICUS: ICD-10-CM

## 2024-10-18 PROCEDURE — 99213 OFFICE O/P EST LOW 20 MIN: CPT

## 2024-11-25 ENCOUNTER — RX RENEWAL (OUTPATIENT)
Age: 29
End: 2024-11-25

## 2025-01-16 ENCOUNTER — RX RENEWAL (OUTPATIENT)
Age: 30
End: 2025-01-16

## 2025-01-27 ENCOUNTER — RX RENEWAL (OUTPATIENT)
Age: 30
End: 2025-01-27

## 2025-03-24 ENCOUNTER — RX RENEWAL (OUTPATIENT)
Age: 30
End: 2025-03-24

## 2025-04-10 ENCOUNTER — APPOINTMENT (OUTPATIENT)
Dept: NEUROLOGY | Facility: CLINIC | Age: 30
End: 2025-04-10
Payer: MEDICAID

## 2025-04-10 ENCOUNTER — NON-APPOINTMENT (OUTPATIENT)
Age: 30
End: 2025-04-10

## 2025-04-10 VITALS
DIASTOLIC BLOOD PRESSURE: 77 MMHG | SYSTOLIC BLOOD PRESSURE: 110 MMHG | BODY MASS INDEX: 18.25 KG/M2 | HEIGHT: 63 IN | HEART RATE: 88 BPM | RESPIRATION RATE: 20 BRPM | WEIGHT: 103 LBS | OXYGEN SATURATION: 99 %

## 2025-04-10 DIAGNOSIS — G40.309 GENERALIZED IDIOPATHIC EPILEPSY AND EPILEPTIC SYNDROMES, NOT INTRACTABLE, W/OUT STATUS EPILEPTICUS: ICD-10-CM

## 2025-04-10 PROCEDURE — 99213 OFFICE O/P EST LOW 20 MIN: CPT

## 2025-04-25 ENCOUNTER — RX RENEWAL (OUTPATIENT)
Age: 30
End: 2025-04-25

## 2025-05-05 ENCOUNTER — RX RENEWAL (OUTPATIENT)
Age: 30
End: 2025-05-05

## 2025-07-02 ENCOUNTER — RX RENEWAL (OUTPATIENT)
Age: 30
End: 2025-07-02

## 2025-07-21 ENCOUNTER — RX RENEWAL (OUTPATIENT)
Age: 30
End: 2025-07-21